# Patient Record
Sex: FEMALE | Race: WHITE | NOT HISPANIC OR LATINO | Employment: FULL TIME | URBAN - METROPOLITAN AREA
[De-identification: names, ages, dates, MRNs, and addresses within clinical notes are randomized per-mention and may not be internally consistent; named-entity substitution may affect disease eponyms.]

---

## 2023-11-01 PROBLEM — M70.70 BURSITIS OF HIP: Status: ACTIVE | Noted: 2023-11-01

## 2023-11-01 PROBLEM — T84.018A FAILURE OF TOTAL HIP ARTHROPLASTY, INITIAL ENCOUNTER (CMS-HCC): Status: ACTIVE | Noted: 2023-11-01

## 2023-11-01 PROBLEM — Z96.649 FAILURE OF TOTAL HIP ARTHROPLASTY, INITIAL ENCOUNTER (CMS-HCC): Status: ACTIVE | Noted: 2023-11-01

## 2023-11-01 PROBLEM — Z96.649 HISTORY OF TOTAL HIP REPLACEMENT: Status: ACTIVE | Noted: 2023-11-01

## 2023-11-01 PROBLEM — E03.9 HYPOTHYROID: Status: ACTIVE | Noted: 2023-11-01

## 2023-11-01 PROBLEM — I10 HYPERTENSION: Status: ACTIVE | Noted: 2023-11-01

## 2023-11-01 RX ORDER — AMLODIPINE BESYLATE 5 MG/1
TABLET ORAL
COMMUNITY

## 2023-11-01 RX ORDER — OXYCODONE AND ACETAMINOPHEN 5; 325 MG/1; MG/1
TABLET ORAL
COMMUNITY
Start: 2018-01-31

## 2023-11-01 RX ORDER — LEVOTHYROXINE SODIUM 112 UG/1
112 TABLET ORAL EVERY 24 HOURS
COMMUNITY

## 2023-11-01 RX ORDER — DOCUSATE SODIUM 100 MG/1
100 CAPSULE, LIQUID FILLED ORAL 2 TIMES DAILY
COMMUNITY
Start: 2018-01-31

## 2023-11-01 RX ORDER — LOSARTAN POTASSIUM AND HYDROCHLOROTHIAZIDE 12.5; 1 MG/1; MG/1
1 TABLET ORAL EVERY 24 HOURS
COMMUNITY

## 2023-11-01 RX ORDER — OXYBUTYNIN CHLORIDE 5 MG/1
TABLET, EXTENDED RELEASE ORAL
COMMUNITY

## 2023-11-01 RX ORDER — TRAMADOL HYDROCHLORIDE 50 MG/1
TABLET ORAL EVERY 6 HOURS
COMMUNITY
Start: 2018-01-31

## 2023-11-02 ENCOUNTER — ANCILLARY PROCEDURE (OUTPATIENT)
Dept: RADIOLOGY | Facility: CLINIC | Age: 58
End: 2023-11-02
Payer: COMMERCIAL

## 2023-11-02 ENCOUNTER — OFFICE VISIT (OUTPATIENT)
Dept: ORTHOPEDIC SURGERY | Facility: CLINIC | Age: 58
End: 2023-11-02
Payer: COMMERCIAL

## 2023-11-02 DIAGNOSIS — Z96.642 HISTORY OF HIP REPLACEMENT, TOTAL, LEFT: ICD-10-CM

## 2023-11-02 DIAGNOSIS — Z96.649 FAILED TOTAL HIP ARTHROPLASTY, INITIAL ENCOUNTER (CMS-HCC): Primary | ICD-10-CM

## 2023-11-02 DIAGNOSIS — T84.018A FAILED TOTAL HIP ARTHROPLASTY, INITIAL ENCOUNTER (CMS-HCC): Primary | ICD-10-CM

## 2023-11-02 PROCEDURE — 99214 OFFICE O/P EST MOD 30 MIN: CPT | Performed by: ORTHOPAEDIC SURGERY

## 2023-11-02 PROCEDURE — 73502 X-RAY EXAM HIP UNI 2-3 VIEWS: CPT | Mod: LEFT SIDE | Performed by: RADIOLOGY

## 2023-11-02 PROCEDURE — 73502 X-RAY EXAM HIP UNI 2-3 VIEWS: CPT | Mod: LT,FY

## 2023-11-02 NOTE — PROGRESS NOTES
Patient is a 58-year-old female who presents today for discussion of possible left total hip arthroplasty revision.  She has significant eccentric polyethylene wear with significant lysis on the osteotomy.  We previously revised her right side in 2019.  She is doing well from that standpoint.  She is relatively asymptomatic despite her radiographic appearance.    AAOx3, NAD, walks with a moderate antalgic gait  Moderate pain with flexion internal rotation  Positive Stinchfield  Mild TTP over trochanter laterally  5/5 Hip flexion/knee extension/df/pf/ehl  SILT in a nhung/saph/per/tib distribution  ½ dorsalis pedis and posterior tibial pulse  no popliteal or inguinal lymphadenopathy  no other overlying lesions  mood: euthymic  Respirations non    Plain films were reviewed by myself in clinic today.  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    Patient is now failed a greater than 3-month trial of reasonable conservative treatment and wishes to proceed with revision total hip arthroplasty which is indicated at this time.  We discussed the risk benefits alternatives to surgery.  All of their questions were answered.    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 I reviewed concerns about implant wear, loosening, breakage, infection, infection prophylaxis, DVT, PE, death and other medical and anesthetic complications of surgery. We talked about the potential for persistent pain following surgery since there are many possible causes for hip and leg pain. The patient was advised that hip replacement will only relieve pain that is coming from the hip. We talked  about leg length discrepancy, neurovascular problems and dislocation after surgery. The patient understands that we may have to lengthen the leg slightly to provide for adequate stability of the hip. I reviewed dislocation precautions and activity restrictions in detail. We discussed advantages and disadvantages of cemented and cementless implant fixation. We discussed the concerns about intraoperative fracture, ingrowth failure, thigh pain and possible post-operative weight bearing restrictions following cementless hip replacement. We discussed advantages and disadvantages of different surgical approaches. We discussed the possible need for a homologous blood transfusion. We discussed the fact that many of our patients are able to go home in 1 day or less depending on their health, mobility, pre-op preparation, individual home situation and personal preference. The patient has identified their personal goals of their joint replacement surgery and recovery and we have discussed them. These are documented in our "[employer] patient engagement platform. In addition, we have discussed the advantages and disadvantages of various implant and fixation options, as well as various surgical approaches.  The basic concepts of the joint replacement procedure has been reviewed with the patient and the patient has been provided the opportunity to see an actual implant either in the office or in our pre-op education class. All of the patients questions were answered. The patient can call my office to schedule surgery and the pre-op teaching class. I told the patient that they should contact their primary care physician to discuss fitness for surgery.     This note was created using voice recognition software and was not corrected for typographical or grammatical errors.

## 2023-11-21 ENCOUNTER — TELEPHONE (OUTPATIENT)
Dept: ORTHOPEDIC SURGERY | Facility: HOSPITAL | Age: 58
End: 2023-11-21

## 2023-11-21 NOTE — TELEPHONE ENCOUNTER
PATIENT NEED A REVISION LEFT MISA SURGERY AT [facility].  MAY I ADD A 4TH CASE FOR A REGULAR [facility] SURGERY DAY?

## 2024-05-10 ENCOUNTER — HOSPITAL ENCOUNTER (OUTPATIENT)
Dept: RADIOLOGY | Facility: HOSPITAL | Age: 59
Discharge: HOME | End: 2024-05-10
Payer: COMMERCIAL

## 2024-05-10 VITALS — BODY MASS INDEX: 30.64 KG/M2 | HEIGHT: 59 IN | WEIGHT: 152 LBS

## 2024-05-10 DIAGNOSIS — Z12.31 ENCOUNTER FOR SCREENING MAMMOGRAM FOR MALIGNANT NEOPLASM OF BREAST: ICD-10-CM

## 2024-05-10 PROCEDURE — 77067 SCR MAMMO BI INCL CAD: CPT

## 2024-05-10 PROCEDURE — 77067 SCR MAMMO BI INCL CAD: CPT | Performed by: STUDENT IN AN ORGANIZED HEALTH CARE EDUCATION/TRAINING PROGRAM

## 2024-05-10 PROCEDURE — 77063 BREAST TOMOSYNTHESIS BI: CPT | Performed by: STUDENT IN AN ORGANIZED HEALTH CARE EDUCATION/TRAINING PROGRAM

## 2024-06-28 ENCOUNTER — OFFICE VISIT (OUTPATIENT)
Dept: ORTHOPEDIC SURGERY | Facility: CLINIC | Age: 59
End: 2024-06-28
Payer: COMMERCIAL

## 2024-06-28 VITALS — HEIGHT: 59 IN | WEIGHT: 150 LBS | BODY MASS INDEX: 30.24 KG/M2

## 2024-06-28 DIAGNOSIS — T84.018A FAILED TOTAL HIP ARTHROPLASTY, INITIAL ENCOUNTER (CMS-HCC): Primary | ICD-10-CM

## 2024-06-28 DIAGNOSIS — Z96.649 FAILED TOTAL HIP ARTHROPLASTY, INITIAL ENCOUNTER (CMS-HCC): Primary | ICD-10-CM

## 2024-06-28 PROCEDURE — 99214 OFFICE O/P EST MOD 30 MIN: CPT | Performed by: ORTHOPAEDIC SURGERY

## 2024-06-28 PROCEDURE — 3008F BODY MASS INDEX DOCD: CPT | Performed by: ORTHOPAEDIC SURGERY

## 2024-06-28 ASSESSMENT — PAIN - FUNCTIONAL ASSESSMENT: PAIN_FUNCTIONAL_ASSESSMENT: NO/DENIES PAIN

## 2024-07-01 PROBLEM — T84.018A: Status: ACTIVE | Noted: 2024-06-30

## 2024-07-19 ENCOUNTER — CLINICAL SUPPORT (OUTPATIENT)
Dept: PREADMISSION TESTING | Facility: HOSPITAL | Age: 59
End: 2024-07-19
Payer: COMMERCIAL

## 2024-07-19 DIAGNOSIS — T84.018A: ICD-10-CM

## 2024-07-19 RX ORDER — FAMOTIDINE 20 MG/1
TABLET, FILM COATED ORAL 2 TIMES DAILY PRN
COMMUNITY

## 2024-07-19 RX ORDER — NAPROXEN 250 MG/1
250 TABLET ORAL
COMMUNITY

## 2024-07-23 ENCOUNTER — HOSPITAL ENCOUNTER (OUTPATIENT)
Dept: RADIOLOGY | Facility: HOSPITAL | Age: 59
Discharge: HOME | End: 2024-07-23
Payer: COMMERCIAL

## 2024-07-23 ENCOUNTER — PRE-ADMISSION TESTING (OUTPATIENT)
Dept: PREADMISSION TESTING | Facility: HOSPITAL | Age: 59
End: 2024-07-23
Payer: COMMERCIAL

## 2024-07-23 ENCOUNTER — APPOINTMENT (OUTPATIENT)
Dept: ORTHOPEDIC SURGERY | Facility: HOSPITAL | Age: 59
End: 2024-07-23
Payer: COMMERCIAL

## 2024-07-23 ENCOUNTER — LAB (OUTPATIENT)
Dept: LAB | Facility: LAB | Age: 59
End: 2024-07-23
Payer: COMMERCIAL

## 2024-07-23 VITALS
HEIGHT: 59 IN | OXYGEN SATURATION: 98 % | HEART RATE: 75 BPM | TEMPERATURE: 97.2 F | WEIGHT: 152.12 LBS | BODY MASS INDEX: 30.67 KG/M2 | RESPIRATION RATE: 16 BRPM | SYSTOLIC BLOOD PRESSURE: 137 MMHG | DIASTOLIC BLOOD PRESSURE: 71 MMHG

## 2024-07-23 DIAGNOSIS — T84.018A: ICD-10-CM

## 2024-07-23 DIAGNOSIS — Z01.818 PRE-OP TESTING: ICD-10-CM

## 2024-07-23 DIAGNOSIS — Z01.818 PRE-OP TESTING: Primary | ICD-10-CM

## 2024-07-23 LAB
ANION GAP SERPL CALC-SCNC: 11 MMOL/L (ref 10–20)
ATRIAL RATE: 70 BPM
BASOPHILS # BLD AUTO: 0.07 X10*3/UL (ref 0–0.1)
BASOPHILS NFR BLD AUTO: 1.1 %
BUN SERPL-MCNC: 18 MG/DL (ref 6–23)
CALCIUM SERPL-MCNC: 10 MG/DL (ref 8.6–10.3)
CHLORIDE SERPL-SCNC: 102 MMOL/L (ref 98–107)
CO2 SERPL-SCNC: 30 MMOL/L (ref 21–32)
CREAT SERPL-MCNC: 0.67 MG/DL (ref 0.5–1.05)
EGFRCR SERPLBLD CKD-EPI 2021: >90 ML/MIN/1.73M*2
EOSINOPHIL # BLD AUTO: 0.21 X10*3/UL (ref 0–0.7)
EOSINOPHIL NFR BLD AUTO: 3.3 %
ERYTHROCYTE [DISTWIDTH] IN BLOOD BY AUTOMATED COUNT: 12.1 % (ref 11.5–14.5)
EST. AVERAGE GLUCOSE BLD GHB EST-MCNC: 94 MG/DL
GLUCOSE SERPL-MCNC: 88 MG/DL (ref 74–99)
HBA1C MFR BLD: 4.9 %
HCT VFR BLD AUTO: 45.5 % (ref 36–46)
HGB BLD-MCNC: 15.4 G/DL (ref 12–16)
IMM GRANULOCYTES # BLD AUTO: 0.02 X10*3/UL (ref 0–0.7)
IMM GRANULOCYTES NFR BLD AUTO: 0.3 % (ref 0–0.9)
LYMPHOCYTES # BLD AUTO: 1.93 X10*3/UL (ref 1.2–4.8)
LYMPHOCYTES NFR BLD AUTO: 30.2 %
MCH RBC QN AUTO: 31.8 PG (ref 26–34)
MCHC RBC AUTO-ENTMCNC: 33.8 G/DL (ref 32–36)
MCV RBC AUTO: 94 FL (ref 80–100)
MONOCYTES # BLD AUTO: 0.44 X10*3/UL (ref 0.1–1)
MONOCYTES NFR BLD AUTO: 6.9 %
NEUTROPHILS # BLD AUTO: 3.72 X10*3/UL (ref 1.2–7.7)
NEUTROPHILS NFR BLD AUTO: 58.2 %
NRBC BLD-RTO: 0 /100 WBCS (ref 0–0)
P AXIS: 28 DEGREES
P OFFSET: 212 MS
P ONSET: 157 MS
PLATELET # BLD AUTO: 203 X10*3/UL (ref 150–450)
POTASSIUM SERPL-SCNC: 3.8 MMOL/L (ref 3.5–5.3)
PR INTERVAL: 136 MS
Q ONSET: 225 MS
QRS COUNT: 12 BEATS
QRS DURATION: 72 MS
QT INTERVAL: 372 MS
QTC CALCULATION(BAZETT): 401 MS
QTC FREDERICIA: 391 MS
R AXIS: -14 DEGREES
RBC # BLD AUTO: 4.84 X10*6/UL (ref 4–5.2)
SODIUM SERPL-SCNC: 139 MMOL/L (ref 136–145)
T AXIS: -3 DEGREES
T OFFSET: 411 MS
VENTRICULAR RATE: 70 BPM
WBC # BLD AUTO: 6.4 X10*3/UL (ref 4.4–11.3)

## 2024-07-23 PROCEDURE — 87081 CULTURE SCREEN ONLY: CPT | Mod: AHULAB | Performed by: NURSE PRACTITIONER

## 2024-07-23 PROCEDURE — 73502 X-RAY EXAM HIP UNI 2-3 VIEWS: CPT | Mod: LT

## 2024-07-23 PROCEDURE — 93005 ELECTROCARDIOGRAM TRACING: CPT | Performed by: NURSE PRACTITIONER

## 2024-07-23 PROCEDURE — 80048 BASIC METABOLIC PNL TOTAL CA: CPT

## 2024-07-23 PROCEDURE — 36415 COLL VENOUS BLD VENIPUNCTURE: CPT

## 2024-07-23 PROCEDURE — 85025 COMPLETE CBC W/AUTO DIFF WBC: CPT

## 2024-07-23 PROCEDURE — 83036 HEMOGLOBIN GLYCOSYLATED A1C: CPT

## 2024-07-23 RX ORDER — CHLORHEXIDINE GLUCONATE ORAL RINSE 1.2 MG/ML
SOLUTION DENTAL
Qty: 475 ML | Refills: 0 | Status: SHIPPED | OUTPATIENT
Start: 2024-07-23

## 2024-07-23 ASSESSMENT — ENCOUNTER SYMPTOMS
NEUROLOGICAL NEGATIVE: 1
ABDOMINAL PAIN: 0
NECK NEGATIVE: 1
ARTHRALGIAS: 0
RESPIRATORY NEGATIVE: 1
DYSPNEA WITH EXERTION: 0
CONSTIPATION: 0
CONSTITUTIONAL NEGATIVE: 1
BRUISES/BLEEDS EASILY: 0
GASTROINTESTINAL NEGATIVE: 1
ENDOCRINE NEGATIVE: 1
PALPITATIONS: 0
NAUSEA: 0
DIARRHEA: 0
LIMITED RANGE OF MOTION: 1
MYALGIAS: 0
DYSPNEA AT REST: 0

## 2024-07-23 NOTE — H&P (VIEW-ONLY)
[facility]       Name: Ashley Ngo (Ashley Ngo)  /Age: 1965/58 y.o.       Date of Consult: 24     Referring Provider: Dr. Muniz    Surgery, Date, and Length: Left Hip Total Arthroplasty Revision - Left, 24, 240 min.    Ashley Ngo is a 58 year-old female who presents to the [facility] for perioperative risk assessment prior to surgery.  Ashley Ngo  has significant eccentric polyethylene wear with significant lysis on the osteotomy. She has had a previous revision of her right side in 2019. She is doing well from that standpoint. She is relatively asymptomatic despite her radiographic appearance.   Patient presents with a primary diagnosis of Broken internal joint prosthesis, other site, initial encounter .     This note was created in part upon personal review of patient's medical records.      Patient is scheduled to have  Left Hip Total Arthroplasty Revision - Left      +PONV  Pt denies any past history of anesthetic complications such as awareness, prolonged sedation, dental damage, aspiration, cardiac arrest, difficult intubation, difficult I.V. access or unexpected hospital admissions.  NO malignant hyperthermia and or pseudocholinesterase deficiency.  No history of blood transfusions     The patient is not a Protestant and will accept blood and blood products if medically indicated.       Past Medical History:   Diagnosis Date    Broken internal joint prosthesis (CMS-HCC)     Bursitis     hips    GERD (gastroesophageal reflux disease)     Hip pain     HLD (hyperlipidemia)     patient denies    HTN (hypertension)     Hypothyroidism     JRA (juvenile rheumatoid arthritis) (Multi)     OA (osteoarthritis)     PONV (postoperative nausea and vomiting)        Past Surgical History:   Procedure Laterality Date    BI US GUIDED BREAST LOCALIZATION AND BIOPSY LEFT Left 2021    BI US GUIDED BREAST LOCALIZATION AND BIOPSY LEFT LAK CLINICAL  [facility]    BREAST BIOPSY Left 04/16/2021    COLONOSCOPY      ECTOPIC PREGNANCY SURGERY      ENDOMETRIAL ABLATION      HIP SURGERY Right 2017    JOINT REPLACEMENT Bilateral 1997    hips    TUBAL LIGATION      UPPER GASTROINTESTINAL ENDOSCOPY         Family History   Problem Relation Name Age of Onset    Ovarian cancer Mother  56    Lung disease Mother      Hypertension Father      Other (bladder cancer) Father      Heart attack Father      Cardiomyopathy Father      Other (bile duct cancer) Sister      Breast cancer Maternal Grandmother  68     Social History     Socioeconomic History    Marital status:    Tobacco Use    Smoking status: Never    Smokeless tobacco: Never   Vaping Use    Vaping status: Never Used   Substance and Sexual Activity    Alcohol use: Yes     Comment: socially 2x/week, 2-3 cocktails    Drug use: Defer    Sexual activity: Defer     Social Determinants of Health     Financial Resource Strain: Low Risk  (3/3/2023)    Received from [facility]    Overall Financial Resource Strain (CARDIA)     Difficulty of Paying Living Expenses: Not hard at all   Food Insecurity: No Food Insecurity (3/3/2023)    Received from [facility]    Hunger Vital Sign     Worried About Running Out of Food in the Last Year: Never true     Ran Out of Food in the Last Year: Never true   Transportation Needs: No Transportation Needs (3/3/2023)    Received from [facility]    PRAPARE - Transportation     Lack of Transportation (Medical): No     Lack of Transportation (Non-Medical): No   Physical Activity: Inactive (3/3/2023)    Received from [facility]    Exercise Vital Sign     Days of Exercise per Week: 0 days     Minutes of Exercise per Session: 0 min   Stress: No Stress Concern Present (3/3/2023)    Received from [facility]    [employer] - Occupational Stress Questionnaire      Feeling of Stress : Not at all   Social Connections: Moderately Isolated (3/3/2023)    Received from [facility], [facility]    Social Connection and Isolation Panel [NHANES]     Frequency of Communication with Friends and Family: Three times a week     Frequency of Social Gatherings with Friends and Family: Once a week     Attends Restorationism Services: Never     Active Member of Clubs or Organizations: No     Attends Club or Organization Meetings: Never     Marital Status:    Housing Stability: Unknown (3/3/2023)    Received from [facility], [facility]    Housing Stability Vital Sign     Unable to Pay for Housing in the Last Year: No     In the last 12 months, was there a time when you did not have a steady place to sleep or slept in a shelter (including now)?: No        Allergies   Allergen Reactions    Meloxicam Hives, Rash and Unknown         Current Outpatient Medications:     amLODIPine (Norvasc) 5 mg tablet, 1 tablet Orally Once a day, Disp: , Rfl:     calcium carbonate EX (Tums Extra Strength) 300 mg (750 mg) chewable tablet, Chew 300 mg 3 times a day as needed for indigestion or heartburn., Disp: , Rfl:     glucosam/chond-msm1/C/erik/bor (GLUCOSAMINE-CHOND-MSM COMPLEX ORAL), Take 1 capsule by mouth once daily., Disp: , Rfl:     levothyroxine (Synthroid, Levoxyl) 112 mcg tablet, Take 1 tablet (112 mcg) by mouth once every 24 hours., Disp: , Rfl:     losartan-hydrochlorothiazide (Hyzaar) 100-12.5 mg tablet, Take 1 tablet by mouth once every 24 hours., Disp: , Rfl:     naproxen (Naprosyn) 250 mg tablet, Take 1 tablet (250 mg) by mouth 2 times daily (morning and late afternoon)., Disp: , Rfl:     oxybutynin XL (Ditropan-XL) 5 mg 24 hr tablet, 1 tab(s) orally once a day (oxybutynin XL), Disp: , Rfl:     chlorhexidine (Peridex) 0.12 % solution, Swish for 30 seconds and spit 15mL of solution the night before and morning of surgery (Patient not taking: Reported on 7/23/2024), Disp:  "475 mL, Rfl: 0    famotidine (Pepcid) 20 mg tablet, Take by mouth 2 times a day as needed for heartburn., Disp: , Rfl:       PAT ROS:   Constitutional:   neg    Neuro/Psych:   neg    Eyes:    use of corrective lenses (contacts/glasses)  Ears:    no hearing aides  Nose:   Mouth:   neg    Throat:   neg    Neck:   neg    Cardio:    Functional 4 Mets. Patient denies SOB walking up 2 flights of stairs if patient did not have any limitations with her hip   no chest pain   no palpitations   no dyspnea   no BALTAZAR  Respiratory:   neg    Endocrine:   neg    GI:   neg     no abdominal pain   no constipation   no diarrhea   no nausea  :   neg    Musculoskeletal:    no arthralgias   no myalgias   decreased ROM  Hematologic:    does not bruise/bleed easily   no history of blood transfusion   no blood clots  Skin:  neg        Physical Exam  Physical exam within normal limits.          PAT AIRWAY:   Airway:     Mallampati::  II    Neck ROM::  Full   No broken teeth, no dentures and no missing teeth          Visit Vitals  /71   Pulse 75   Temp 36.2 °C (97.2 °F)   Resp 16   Ht 1.486 m (4' 10.5\")   Wt 69 kg (152 lb 1.9 oz)   SpO2 98%   BMI 31.25 kg/m²   OB Status Postmenopausal   Smoking Status Never   BSA 1.69 m²     Plan    Cardiovascular:  Patient denies any chest pain, tightness, heaviness, pressure, radiating pain, palpitations, irregular heartbeats, lightheadedness, cough, congestion, shortness of breath, BALTAZAR, PND, near syncope, weight loss or gain.    HTN:  Amlodipine-patient to take on dos  Hold any evening dose the night before the day of surgery. Hold the day of surgery.    EKG in PAT on 07/23/24  NSR@70 bpm       RCRI: 0 Risk of Mace: 3.9%    Pulm:  Denies any shortness of breath or activity intolerance.    Stop Bang= 2 (age, htn) low risk    Renal/endo:  Hypothyroidism-  Synthroid-patient to take on dos    GI/:  GERD:  TUMS-patient to continue until night before surgery  Pepcid-patient to take on " dos    Heme:  Patient instructed to ambulate as soon as possible postoperatively to decrease thromboembolic risk.    Initiate mechanical DVT prophylaxis as soon as possible and initiate chemical prophylaxis when deemed safe from a bleeding standpoint post surgery.    Caprini=6    Risk assessment complete.  Patient is scheduled for an intermediate surgical risk procedure.      Labs/testing obtained in PAT on 07/23/24  CBC, BMP, HgA1c, MRSA, EKG  Lab Results   Component Value Date    WBC 6.4 07/23/2024    HGB 15.4 07/23/2024    HCT 45.5 07/23/2024    MCV 94 07/23/2024     07/23/2024     Lab Results   Component Value Date    GLUCOSE 88 07/23/2024    CALCIUM 10.0 07/23/2024     07/23/2024    K 3.8 07/23/2024    CO2 30 07/23/2024     07/23/2024    BUN 18 07/23/2024    CREATININE 0.67 07/23/2024     Lab Results   Component Value Date    HGBA1C 4.9 07/23/2024     Labs reviewed, unremarkable.    Follow up: MRSA    Preoperative medication instructions were provided and reviewed with the patient.  Any additional testing or evaluation was explained to the patient.  Nothing by mouth instructions were discussed and patient's questions were answered prior to conclusion to this encounter.  Patient verbalized understanding of preoperative instructions given in preadmission testing; discharge instructions available in EMR.    This note was dictated with speech recognition.  Minor errors may have been detected during use of speech recognition.

## 2024-07-23 NOTE — CPM/PAT H&P
[facility] Evaluation       Name: Ashley Ngo (Ashley Ngo)  /Age: 1965/58 y.o.       Date of Consult: 24     Referring Provider: Dr. Muniz    Surgery, Date, and Length: Left Hip Total Arthroplasty Revision - Left, 24, 240 min.    Ashley Ngo is a 58 year-old female who presents to the [facility] for perioperative risk assessment prior to surgery.  Ashley Ngo  has significant eccentric polyethylene wear with significant lysis on the osteotomy. She has had a previous revision of her right side in 2019. She is doing well from that standpoint. She is relatively asymptomatic despite her radiographic appearance.   Patient presents with a primary diagnosis of Broken internal joint prosthesis, other site, initial encounter .     This note was created in part upon personal review of patient's medical records.      Patient is scheduled to have  Left Hip Total Arthroplasty Revision - Left      +PONV  Pt denies any past history of anesthetic complications such as awareness, prolonged sedation, dental damage, aspiration, cardiac arrest, difficult intubation, difficult I.V. access or unexpected hospital admissions.  NO malignant hyperthermia and or pseudocholinesterase deficiency.  No history of blood transfusions     The patient is not a Yazdanism and will accept blood and blood products if medically indicated.       Past Medical History:   Diagnosis Date    Broken internal joint prosthesis (CMS-HCC)     Bursitis     hips    GERD (gastroesophageal reflux disease)     Hip pain     HLD (hyperlipidemia)     patient denies    HTN (hypertension)     Hypothyroidism     JRA (juvenile rheumatoid arthritis) (Multi)     OA (osteoarthritis)     PONV (postoperative nausea and vomiting)        Past Surgical History:   Procedure Laterality Date    BI US GUIDED BREAST LOCALIZATION AND BIOPSY LEFT Left 2021    BI US GUIDED BREAST LOCALIZATION AND BIOPSY LEFT LAK CLINICAL  [facility]    BREAST BIOPSY Left 04/16/2021    COLONOSCOPY      ECTOPIC PREGNANCY SURGERY      ENDOMETRIAL ABLATION      HIP SURGERY Right 2017    JOINT REPLACEMENT Bilateral 1997    hips    TUBAL LIGATION      UPPER GASTROINTESTINAL ENDOSCOPY         Family History   Problem Relation Name Age of Onset    Ovarian cancer Mother  56    Lung disease Mother      Hypertension Father      Other (bladder cancer) Father      Heart attack Father      Cardiomyopathy Father      Other (bile duct cancer) Sister      Breast cancer Maternal Grandmother  68     Social History     Socioeconomic History    Marital status:    Tobacco Use    Smoking status: Never    Smokeless tobacco: Never   Vaping Use    Vaping status: Never Used   Substance and Sexual Activity    Alcohol use: Yes     Comment: socially 2x/week, 2-3 cocktails    Drug use: Defer    Sexual activity: Defer     Social Determinants of Health     Financial Resource Strain: Low Risk  (3/3/2023)    Received from [facility]    Overall Financial Resource Strain (CARDIA)     Difficulty of Paying Living Expenses: Not hard at all   Food Insecurity: No Food Insecurity (3/3/2023)    Received from [facility]    Hunger Vital Sign     Worried About Running Out of Food in the Last Year: Never true     Ran Out of Food in the Last Year: Never true   Transportation Needs: No Transportation Needs (3/3/2023)    Received from [facility]    PRAPARE - Transportation     Lack of Transportation (Medical): No     Lack of Transportation (Non-Medical): No   Physical Activity: Inactive (3/3/2023)    Received from [facility]    Exercise Vital Sign     Days of Exercise per Week: 0 days     Minutes of Exercise per Session: 0 min   Stress: No Stress Concern Present (3/3/2023)    Received from [facility]    [employer] - Occupational Stress Questionnaire      Feeling of Stress : Not at all   Social Connections: Moderately Isolated (3/3/2023)    [facility]    Social Connection and Isolation Panel [NHANES]     Frequency of Communication with Friends and Family: Three times a week     Frequency of Social Gatherings with Friends and Family: Once a week     Attends Jain Services: Never     Active Member of Clubs or Organizations: No     Attends Club or Organization Meetings: Never     Marital Status:    Housing Stability: Unknown (3/3/2023)    Received from [facility], [facility]    Housing Stability Vital Sign     Unable to Pay for Housing in the Last Year: No     In the last 12 months, was there a time when you did not have a steady place to sleep or slept in a shelter (including now)?: No        Allergies   Allergen Reactions    Meloxicam Hives, Rash and Unknown         Current Outpatient Medications:     amLODIPine (Norvasc) 5 mg tablet, 1 tablet Orally Once a day, Disp: , Rfl:     calcium carbonate EX (Tums Extra Strength) 300 mg (750 mg) chewable tablet, Chew 300 mg 3 times a day as needed for indigestion or heartburn., Disp: , Rfl:     glucosam/chond-msm1/C/erik/bor (GLUCOSAMINE-CHOND-MSM COMPLEX ORAL), Take 1 capsule by mouth once daily., Disp: , Rfl:     levothyroxine (Synthroid, Levoxyl) 112 mcg tablet, Take 1 tablet (112 mcg) by mouth once every 24 hours., Disp: , Rfl:     losartan-hydrochlorothiazide (Hyzaar) 100-12.5 mg tablet, Take 1 tablet by mouth once every 24 hours., Disp: , Rfl:     naproxen (Naprosyn) 250 mg tablet, Take 1 tablet (250 mg) by mouth 2 times daily (morning and late afternoon)., Disp: , Rfl:     oxybutynin XL (Ditropan-XL) 5 mg 24 hr tablet, 1 tab(s) orally once a day (oxybutynin XL), Disp: , Rfl:     chlorhexidine (Peridex) 0.12 % solution, Swish for 30 seconds and spit 15mL of solution the night before and morning of surgery (Patient not taking: Reported on 7/23/2024), Disp:  "475 mL, Rfl: 0    famotidine (Pepcid) 20 mg tablet, Take by mouth 2 times a day as needed for heartburn., Disp: , Rfl:       PAT ROS:   Constitutional:   neg    Neuro/Psych:   neg    Eyes:    use of corrective lenses (contacts/glasses)  Ears:    no hearing aides  Nose:   Mouth:   neg    Throat:   neg    Neck:   neg    Cardio:    Functional 4 Mets. Patient denies SOB walking up 2 flights of stairs if patient did not have any limitations with her hip   no chest pain   no palpitations   no dyspnea   no BALTAZAR  Respiratory:   neg    Endocrine:   neg    GI:   neg     no abdominal pain   no constipation   no diarrhea   no nausea  :   neg    Musculoskeletal:    no arthralgias   no myalgias   decreased ROM  Hematologic:    does not bruise/bleed easily   no history of blood transfusion   no blood clots  Skin:  neg        Physical Exam  Physical exam within normal limits.          PAT AIRWAY:   Airway:     Mallampati::  II    Neck ROM::  Full   No broken teeth, no dentures and no missing teeth          Visit Vitals  /71   Pulse 75   Temp 36.2 °C (97.2 °F)   Resp 16   Ht 1.486 m (4' 10.5\")   Wt 69 kg (152 lb 1.9 oz)   SpO2 98%   BMI 31.25 kg/m²   OB Status Postmenopausal   Smoking Status Never   BSA 1.69 m²     Plan    Cardiovascular:  Patient denies any chest pain, tightness, heaviness, pressure, radiating pain, palpitations, irregular heartbeats, lightheadedness, cough, congestion, shortness of breath, BALTAZAR, PND, near syncope, weight loss or gain.    HTN:  Amlodipine-patient to take on dos  Hold any evening dose the night before the day of surgery. Hold the day of surgery.    EKG in PAT on 07/23/24  NSR@70 bpm       RCRI: 0 Risk of Mace: 3.9%    Pulm:  Denies any shortness of breath or activity intolerance.    Stop Bang= 2 (age, htn) low risk    Renal/endo:  Hypothyroidism-  Synthroid-patient to take on dos    GI/:  GERD:  TUMS-patient to continue until night before surgery  Pepcid-patient to take on " dos    Heme:  Patient instructed to ambulate as soon as possible postoperatively to decrease thromboembolic risk.    Initiate mechanical DVT prophylaxis as soon as possible and initiate chemical prophylaxis when deemed safe from a bleeding standpoint post surgery.    Caprini=6    Risk assessment complete.  Patient is scheduled for an intermediate surgical risk procedure.      Labs/testing obtained in PAT on 07/23/24  CBC, BMP, HgA1c, MRSA, EKG  Lab Results   Component Value Date    WBC 6.4 07/23/2024    HGB 15.4 07/23/2024    HCT 45.5 07/23/2024    MCV 94 07/23/2024     07/23/2024     Lab Results   Component Value Date    GLUCOSE 88 07/23/2024    CALCIUM 10.0 07/23/2024     07/23/2024    K 3.8 07/23/2024    CO2 30 07/23/2024     07/23/2024    BUN 18 07/23/2024    CREATININE 0.67 07/23/2024     Lab Results   Component Value Date    HGBA1C 4.9 07/23/2024     Labs reviewed, unremarkable.    Follow up: MRSA    Preoperative medication instructions were provided and reviewed with the patient.  Any additional testing or evaluation was explained to the patient.  Nothing by mouth instructions were discussed and patient's questions were answered prior to conclusion to this encounter.  Patient verbalized understanding of preoperative instructions given in preadmission testing; discharge instructions available in EMR.    This note was dictated with speech recognition.  Minor errors may have been detected during use of speech recognition.

## 2024-07-23 NOTE — CPM/PAT NURSE NOTE
CPM/PAT Nurse Note      Name: Ashley Ngo (Ashley Ngo)  /Age: 1965/58 y.o.       Past Medical History:   Diagnosis Date    Broken internal joint prosthesis (CMS-HCC)     Bursitis     hips    GERD (gastroesophageal reflux disease)     Hip pain     HLD (hyperlipidemia)     patient denies    HTN (hypertension)     Hypothyroidism     JRA (juvenile rheumatoid arthritis) (Multi)     OA (osteoarthritis)     PONV (postoperative nausea and vomiting)        Past Surgical History:   Procedure Laterality Date    BI US GUIDED BREAST LOCALIZATION AND BIOPSY LEFT Left 2021    BI US GUIDED BREAST LOCALIZATION AND BIOPSY LEFT [facility]    BREAST BIOPSY Left 2021    COLONOSCOPY      ECTOPIC PREGNANCY SURGERY      ENDOMETRIAL ABLATION      HIP SURGERY Right 2017    JOINT REPLACEMENT Bilateral     hips    TUBAL LIGATION      UPPER GASTROINTESTINAL ENDOSCOPY         Patient Sexual activity questions deferred to the physician.    Family History   Problem Relation Name Age of Onset    Ovarian cancer Mother  56    Lung disease Mother      Hypertension Father      Other (bladder cancer) Father      Heart attack Father      Cardiomyopathy Father      Other (bile duct cancer) Sister      Breast cancer Maternal Grandmother  68       Allergies   Allergen Reactions    Meloxicam Hives, Rash and Unknown       Prior to Admission medications    Medication Sig Start Date End Date Taking? Authorizing Provider   amLODIPine (Norvasc) 5 mg tablet 1 tablet Orally Once a day    Historical Provider, MD   calcium carbonate EX (Tums Extra Strength) 300 mg (750 mg) chewable tablet Chew 300 mg 3 times a day as needed for indigestion or heartburn.    Historical Provider, MD   chlorhexidine (Peridex) 0.12 % solution Swish for 30 seconds and spit 15mL of solution the night before and morning of surgery  Patient not taking: Reported on 2024   EILEEN Santiago-CNP   famotidine (Pepcid) 20 mg tablet  Take by mouth 2 times a day as needed for heartburn.    Historical Provider, MD   glucosam/chond-msm1/C/erik/bor (GLUCOSAMINE-CHOND-MSM COMPLEX ORAL) Take 1 capsule by mouth once daily.    Historical Provider, MD   levothyroxine (Synthroid, Levoxyl) 112 mcg tablet Take 1 tablet (112 mcg) by mouth once every 24 hours.    Historical Provider, MD   losartan-hydrochlorothiazide (Hyzaar) 100-12.5 mg tablet Take 1 tablet by mouth once every 24 hours.    Historical Provider, MD   naproxen (Naprosyn) 250 mg tablet Take 1 tablet (250 mg) by mouth 2 times daily (morning and late afternoon).    Historical Provider, MD   oxybutynin XL (Ditropan-XL) 5 mg 24 hr tablet 1 tab(s) orally once a day (oxybutynin XL)    Historical Provider, MD   docusate sodium (Colace) 100 mg capsule Take 1 capsule (100 mg) by mouth twice a day. 1/31/18 7/19/24  Historical Provider, MD   oxyCODONE-acetaminophen (Percocet) 5-325 mg tablet Take by mouth every 4 hours. 1/31/18 7/19/24  Historical Provider, MD   traMADol (Ultram) 50 mg tablet Take by mouth every 6 hours. 1/31/18 7/19/24  Historical Provider, MD SOLANGE HOOK     DASI Risk Score    No data to display       Caprini DVT Assessment    No data to display       Modified Frailty Index    No data to display       CHADS2 Stroke Risk  Current as of 2 hours ago        N/A 3 to 100%: High Risk   2 to < 3%: Medium Risk   0 to < 2%: Low Risk     Last Change: N/A          This score determines the patient's risk of having a stroke if the patient has atrial fibrillation.        This score is not applicable to this patient. Components are not calculated.          Revised Cardiac Risk Index    No data to display       Apfel Simplified Score    No data to display       Risk Analysis Index Results This Encounter    No data found in the last 1 encounters.         Nurse Plan of Action:       .mrsa

## 2024-07-23 NOTE — CPM/PAT NURSE NOTE
CPM/PAT Nurse Note      Name: Ashley Ngo (Ashley Ngo)  /Age: 1965/58 y.o.       Past Medical History:   Diagnosis Date    Broken internal joint prosthesis (CMS-HCC)     Bursitis     hips    GERD (gastroesophageal reflux disease)     Hip pain     HLD (hyperlipidemia)     patient denies    HTN (hypertension)     Hypothyroidism     JRA (juvenile rheumatoid arthritis) (Multi)     OA (osteoarthritis)     PONV (postoperative nausea and vomiting)        Past Surgical History:   Procedure Laterality Date    BI US GUIDED BREAST LOCALIZATION AND BIOPSY LEFT Left 2021    BI US GUIDED BREAST LOCALIZATION AND BIOPSY LEFT [facility]    BREAST BIOPSY Left 2021    COLONOSCOPY      ECTOPIC PREGNANCY SURGERY      ENDOMETRIAL ABLATION      HIP SURGERY Right 2017    JOINT REPLACEMENT Bilateral     hips    TUBAL LIGATION      UPPER GASTROINTESTINAL ENDOSCOPY         Patient Sexual activity questions deferred to the physician.    Family History   Problem Relation Name Age of Onset    Ovarian cancer Mother  56    Lung disease Mother      Hypertension Father      Other (bladder cancer) Father      Heart attack Father      Cardiomyopathy Father      Other (bile duct cancer) Sister      Breast cancer Maternal Grandmother  68       Allergies   Allergen Reactions    Meloxicam Hives, Rash and Unknown       Prior to Admission medications    Medication Sig Start Date End Date Taking? Authorizing Provider   amLODIPine (Norvasc) 5 mg tablet 1 tablet Orally Once a day    Historical Provider, MD   calcium carbonate EX (Tums Extra Strength) 300 mg (750 mg) chewable tablet Chew 300 mg 3 times a day as needed for indigestion or heartburn.    Historical Provider, MD   chlorhexidine (Peridex) 0.12 % solution Swish for 30 seconds and spit 15mL of solution the night before and morning of surgery  Patient not taking: Reported on 2024   EILEEN Santiago-CNP   famotidine (Pepcid) 20 mg tablet  Take by mouth 2 times a day as needed for heartburn.    Historical Provider, MD   glucosam/chond-msm1/C/erik/bor (GLUCOSAMINE-CHOND-MSM COMPLEX ORAL) Take 1 capsule by mouth once daily.    Historical Provider, MD   levothyroxine (Synthroid, Levoxyl) 112 mcg tablet Take 1 tablet (112 mcg) by mouth once every 24 hours.    Historical Provider, MD   losartan-hydrochlorothiazide (Hyzaar) 100-12.5 mg tablet Take 1 tablet by mouth once every 24 hours.    Historical Provider, MD   naproxen (Naprosyn) 250 mg tablet Take 1 tablet (250 mg) by mouth 2 times daily (morning and late afternoon).    Historical Provider, MD   oxybutynin XL (Ditropan-XL) 5 mg 24 hr tablet 1 tab(s) orally once a day (oxybutynin XL)    Historical Provider, MD   docusate sodium (Colace) 100 mg capsule Take 1 capsule (100 mg) by mouth twice a day. 1/31/18 7/19/24  Historical Provider, MD   oxyCODONE-acetaminophen (Percocet) 5-325 mg tablet Take by mouth every 4 hours. 1/31/18 7/19/24  Historical Provider, MD   traMADol (Ultram) 50 mg tablet Take by mouth every 6 hours. 1/31/18 7/19/24  Historical Provider, MD SOLANGE HOOK     DASI Risk Score    No data to display       Caprini DVT Assessment    No data to display       Modified Frailty Index    No data to display       CHADS2 Stroke Risk  Current as of 2 hours ago        N/A 3 to 100%: High Risk   2 to < 3%: Medium Risk   0 to < 2%: Low Risk     Last Change: N/A          This score determines the patient's risk of having a stroke if the patient has atrial fibrillation.        This score is not applicable to this patient. Components are not calculated.          Revised Cardiac Risk Index    No data to display       Apfel Simplified Score    No data to display       Risk Analysis Index Results This Encounter    No data found in the last 1 encounters.         Nurse Plan of Action:     .gen

## 2024-07-23 NOTE — PREPROCEDURE INSTRUCTIONS
Medication List            Accurate as of July 23, 2024  9:09 AM. Always use your most recent med list.                amLODIPine 5 mg tablet  Commonly known as: Norvasc  Medication Adjustments for Surgery: Take morning of surgery with sip of water, no other fluids     calcium carbonate  mg (750 mg) chewable tablet  Commonly known as: Tums Extra Strength  Medication Adjustments for Surgery: Continue until night before surgery     chlorhexidine 0.12 % solution  Commonly known as: Peridex  Swish for 30 seconds and spit 15mL of solution the night before and morning of surgery     famotidine 20 mg tablet  Commonly known as: Pepcid  Medication Adjustments for Surgery: Take morning of surgery with sip of water, no other fluids     GLUCOSAMINE-CHOND-MSM COMPLEX ORAL  Medication Adjustments for Surgery: Stop 7 days before surgery     levothyroxine 112 mcg tablet  Commonly known as: Synthroid, Levoxyl  Medication Adjustments for Surgery: Take morning of surgery with sip of water, no other fluids     losartan-hydrochlorothiazide 100-12.5 mg tablet  Commonly known as: Hyzaar  Notes to patient: Hold any evening dose the night before the day of surgery. Hold the day of surgery.      naproxen 250 mg tablet  Commonly known as: Naprosyn  Medication Adjustments for Surgery: Stop 7 days before surgery     oxybutynin XL 5 mg 24 hr tablet  Commonly known as: Ditropan-XL  Medication Adjustments for Surgery: Continue until night before surgery                        **Concerning above medication instructions, if medication is normally taken at night, continue normal schedule.**  **DO NOT TAKE NIGHT PRIOR AND MORNING OF SURGERY**    CONTACT SURGEON'S OFFICE IF YOU DEVELOP:  * Fever = 100.4 F   * New respiratory symptoms (e.g. cough, shortness of breath, respiratory distress, sore throat)  * Recent loss of taste or smell  *Flu like symptoms such as headache, fatigue or gastrointestinal symptoms  * You develop any open sores,  shingles, burning or painful urination   AND/OR:  * You no longer wish to have the surgery.  * Any other personal circumstances change that may lead to the need to cancel or defer this surgery.  *You were admitted to any hospital within one week of your planned procedure.    SMOKING:  *Quitting smoking can make a huge difference to your health and recovery from surgery.    *If you need help with quitting, call 6-020-QUIT-NOW.    THE DAY BEFORE SURGERY:  *Do not eat any food after midnight the night before surgery.   *You are permitted to drink clear liquids (i.e. water, black coffee (no milk or cream), tea, apple juice and electrolyte drinks (gatorade)) up to 13.5 ounces, up to 2 hours before your arrival time.  *You may chew gum until 2 hours before your surgery    SURGICAL TIME  *You will be contacted between 2 p.m. and 6 p.m. the business day before your surgery with your arrival time.  *If you haven't received a call by 6pm, call [phone].  *Scheduled surgery times may change and you will be notified if this occurs-check your personal voicemail for any updates.    ON THE MORNING OF SURGERY:  *Wear comfortable, loose fitting clothing.   *Do not use moisturizers, creams, lotions or perfume.  *All jewelry and valuables should be left at home.  *Prosthetic devices such as contact lenses, hearing aids, dentures, eyelash extensions, hairpins and body piercing must be removed before surgery.    BRING WITH YOU:  *Photo ID and insurance card  *Current list of medicines and allergies  *Pacemaker/Defibrillator/Heart stent cards  *CPAP machine and mask  *Slings/splints/crutches  *Copy of your complete Advanced Directive/DHPOA-if applicable  *Neurostimulator implant remote    PARKING AND ARRIVAL:  *Check in at the Main Entrance desk and let them know you are here for surgery.  *You will be directed to the 2nd floor surgical waiting area.    AFTER OUTPATIENT SURGERY:  *A responsible adult MUST accompany you at the time of  discharge and stay with you for 24 hours after your surgery.  *You may NOT drive yourself home after surgery.  *You may use a taxi or ride sharing service ([employer], [employer]) to return home ONLY if you are accompanied by a friend or family member.  *Instructions for resuming your medications will be provided by your surgeon.       Home Preoperative Antibacterial Shower     What is a home preoperative antibacterial shower?  This shower is a way of cleaning the skin with a germ killing soap before surgery.  The soap contains chlorhexidine, commonly known as CHG.  CHG is a soap for your skin with germ killing ability.  Let your doctor know if you are allergic to chlorhexidine.    Why do I need to take a preoperative antibacterial shower?  Skin is not sterile.  It is best to try to make your skin as free of germs as possible before surgery.  Proper cleansing with a germ killing soap before surgery can lower the number of germs on your skin.  This helps to reduce the risk of infection at the surgical site.  Following the instructions listed below will help you prepare your skin for surgery.      How do I use the CHG skin cleanser?  Steps:  Begin using your CHG soap five days before your scheduled surgery on ________________________.    Days 1-4 Shower before bed:  Wash your face and genitals with your normal soap and rinse.    Wash and rinse your hair using the CHG soap. Rinse completely, do not condition your   Hair.          3.    Apply the CHG soap to a clean wet washcloth.  Turn the water off or move away                From the water spray to avoid premature rinsing of the CHG soap as you are applying.     4.   Lather your entire body from the neck down.  Do not use on your face or genitals.   Pay special attention to the area(s) where your incision(s) will be located unless they are on your face.  Avoid scrubbing your skin too hard.  The important point is to have the CHG soap sit on your skin for 3 minutes.    When the 3  minutes are up, turn on the water and rinse the CHG soap off your body completely.   Pat yourself dry with a clean, freshly-laundered towel.  Dress in clean, freshly laundered night clothes.    Be sure to sleep with clean, freshly laundered sheets.  Day 5:  Last shower is the morning before surgery: Follow above Instructions.    NOTE:    *Hair extensions should be removed    *Keep CHG soap out of eyes and ear canals   *DO NOT wash with regular soap on your body after you have used the CHG        soap solution  *DO NOT apply powders, lotions, or perfume.  *Deodorant may be used days 1-4, BUT NOT the day of surgery    Who should I contact if I have any questions regarding the use of CHG soap?  Call the [facility], Preadmission Testing at [phone] if you have any questions.              Patient Information: Pre-Operative Infection Prevention Measures     Why did I have my nose, under my arms and groin swabbed?  The purpose of the swab is to identify Staphylococcus aureus inside your nose or on your skin.  The swab was sent to the laboratory for culture.  A positive swab/culture for Staphylococcus aureus is called colonization or carriage.      What is Staphylococcus aureus?  Staphylococcus aureus, also known as “staph”, is a germ found on the skin or in the nose of healthy people.  Sometimes Staphylococcus aureus can get into the body and cause an infection.  This can be minor (such as pimples, boils or other skin problems).  It might also be serious (such as blood infection, pneumonia or a surgical site infection).    What is Staphylococcus aureus colonization or carriage?  Colonization or carriage means that a person has the germ but is not sick from it.  These bacteria can be spread on the hands or when breathing or sneezing.    How is Staphylococcus aureus spread?  It is most often spread by close contact with a person or item that carries it.    What happens if my culture is  positive for Staphylococcus aureus?  Your doctor/medical team will use this information to guide any antibiotic treatment which may be necessary.  Regardless of the culture results, we will clean the inside of your nose with a betadine swab just before you have your surgery.      Will I get an infection if I have Staphylococcus aureus in my nose or on my skin?  Anyone can get an infection with Staphylococcus aureus.  However, the best way to reduce your risk of infection is to follow the instructions provided to you for the use of your CHG soap and dental rinse.        Who should I contact if I have any questions?  Call the [facility], Preadmission Testing at [phone] if you have any questions.           Patient Information: Oral/Dental Rinse  **This is a prescription; pick it up at your preferred local pharmacy **  What is oral/dental rinse?   It is a mouthwash. It is a way of cleaning the mouth with a germ killing solution before your surgery.  The solution contains chlorhexidine, commonly known as CHG.   It is used inside the mouth to kill a bacteria known as Staphylococcus aureus.  Let your doctor know if you are allergic to Chlorhexidine.    Why do I need to use CHG oral/dental rinse?  The CHG oral/dental rinse helps to kill a bacteria in your mouth known a Staphylococcus aureus.     This reduces the risk of infection at the surgical site.      Using your CHG oral/dental rinse  STEPS:  Use your CHG oral/dental rinse after you brush your teeth the night before (at bedtime) and the morning of your surgery.  Follow all directions on your prescription label.    Use the cap on the container to measure 15ml (fill cap to fill line)  Swish (gargle if you can) the mouthwash in your mouth for at least 30 seconds, (do not to swallow) spit out  After you use your CHG rinse, do not rinse your mouth with water, drink or eat.  Please refer to prescription label for the appropriate time to  resume oral intake  Dental rinse comes in one size bottle: 473ml ~16oz.  You will have leftover    rinse, discard after this use.    What side effects might I have using the CHG oral/dental rinse?  CHG rinse will stick to plaque on the teeth.  Brush and floss just before use.  Teeth brushing will help avoid staining of plaque during use.    Who should I contact if I have questions about the CHG oral/dental rinse?  Please call [facility], Preadmission Testing at [phone] if you have any questions

## 2024-07-25 LAB — STAPHYLOCOCCUS SPEC CULT: NORMAL

## 2024-07-30 NOTE — PROGRESS NOTES
Patient is a 58-year-old female who presents today for discussion of possible left total hip arthroplasty revision.  She has significant eccentric polyethylene wear with significant lysis on the osteotomy.  We previously revised her right side in 2019.  She is doing well from that standpoint.  She is relatively asymptomatic despite her radiographic appearance.     AAOx3, NAD, walks with a moderate antalgic gait  Moderate pain with flexion internal rotation  Positive Stinchfield  Mild TTP over trochanter laterally  5/5 Hip flexion/knee extension/df/pf/ehl  SILT in a nhung/saph/per/tib distribution  ½ dorsalis pedis and posterior tibial pulse  no popliteal or inguinal lymphadenopathy  no other overlying lesions  mood: euthymic  Respirations non     Plain films were reviewed by myself in clinic today.  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     Patient is now failed a greater than 3-month trial of reasonable conservative treatment and wishes to proceed with revision total hip arthroplasty which is indicated at this time.  We discussed the risk benefits alternatives to surgery.  All of their questions were answered.     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 I reviewed concerns about implant wear, loosening, breakage, infection, infection prophylaxis, DVT, PE, death and other medical and anesthetic complications of surgery. We talked about the potential for persistent pain following surgery since there are many possible causes for hip and leg pain. The patient was advised that hip replacement will only relieve pain that is coming from the hip. We  talked about leg length discrepancy, neurovascular problems and dislocation after surgery. The patient understands that we may have to lengthen the leg slightly to provide for adequate stability of the hip. I reviewed dislocation precautions and activity restrictions in detail. We discussed advantages and disadvantages of cemented and cementless implant fixation. We discussed the concerns about intraoperative fracture, ingrowth failure, thigh pain and possible post-operative weight bearing restrictions following cementless hip replacement. We discussed advantages and disadvantages of different surgical approaches. We discussed the possible need for a homologous blood transfusion. We discussed the fact that many of our patients are able to go home in 1 day or less depending on their health, mobility, pre-op preparation, individual home situation and personal preference. The patient has identified their personal goals of their joint replacement surgery and recovery and we have discussed them. These are documented in our disco volante patient engagement platform. In addition, we have discussed the advantages and disadvantages of various implant and fixation options, as well as various surgical approaches.  The basic concepts of the joint replacement procedure has been reviewed with the patient and the patient has been provided the opportunity to see an actual implant either in the office or in our pre-op education class. All of the patients questions were answered. The patient can call my office to schedule surgery and the pre-op teaching class. I told the patient that they should contact their primary care physician to discuss fitness for surgery.      This note was created using voice recognition software and was not corrected for typographical or grammatical errors.

## 2024-08-05 ENCOUNTER — ANESTHESIA EVENT (OUTPATIENT)
Dept: OPERATING ROOM | Facility: HOSPITAL | Age: 59
DRG: 470 | End: 2024-08-05
Payer: COMMERCIAL

## 2024-08-06 ENCOUNTER — APPOINTMENT (OUTPATIENT)
Dept: RADIOLOGY | Facility: HOSPITAL | Age: 59
DRG: 470 | End: 2024-08-06
Payer: COMMERCIAL

## 2024-08-06 ENCOUNTER — HOSPITAL ENCOUNTER (INPATIENT)
Facility: HOSPITAL | Age: 59
LOS: 1 days | Discharge: HOME | DRG: 470 | End: 2024-08-07
Attending: ORTHOPAEDIC SURGERY | Admitting: ORTHOPAEDIC SURGERY
Payer: COMMERCIAL

## 2024-08-06 ENCOUNTER — HOME HEALTH ADMISSION (OUTPATIENT)
Dept: HOME HEALTH SERVICES | Facility: HOME HEALTH | Age: 59
End: 2024-08-06
Payer: COMMERCIAL

## 2024-08-06 ENCOUNTER — ANESTHESIA (OUTPATIENT)
Dept: OPERATING ROOM | Facility: HOSPITAL | Age: 59
DRG: 470 | End: 2024-08-06
Payer: COMMERCIAL

## 2024-08-06 DIAGNOSIS — Z96.649 HISTORY OF TOTAL HIP REPLACEMENT: ICD-10-CM

## 2024-08-06 DIAGNOSIS — Z96.642 S/P TOTAL LEFT HIP ARTHROPLASTY: Primary | ICD-10-CM

## 2024-08-06 DIAGNOSIS — T84.018A: ICD-10-CM

## 2024-08-06 PROBLEM — M19.90 OSTEOARTHRITIS: Status: ACTIVE | Noted: 2024-08-06

## 2024-08-06 PROBLEM — M06.9 RHEUMATOID ARTHRITIS (MULTI): Status: ACTIVE | Noted: 2024-08-06

## 2024-08-06 PROBLEM — M16.12 UNILATERAL PRIMARY OSTEOARTHRITIS, LEFT HIP: Status: ACTIVE | Noted: 2024-08-06

## 2024-08-06 PROBLEM — K21.9 GASTROESOPHAGEAL REFLUX DISEASE: Status: ACTIVE | Noted: 2024-08-06

## 2024-08-06 LAB
ABO GROUP (TYPE) IN BLOOD: NORMAL
ANTIBODY SCREEN: NORMAL
RH FACTOR (ANTIGEN D): NORMAL

## 2024-08-06 PROCEDURE — 7100000002 HC RECOVERY ROOM TIME - EACH INCREMENTAL 1 MINUTE: Performed by: ORTHOPAEDIC SURGERY

## 2024-08-06 PROCEDURE — 2500000004 HC RX 250 GENERAL PHARMACY W/ HCPCS (ALT 636 FOR OP/ED): Performed by: NURSE ANESTHETIST, CERTIFIED REGISTERED

## 2024-08-06 PROCEDURE — 87102 FUNGUS ISOLATION CULTURE: CPT | Mod: AHULAB | Performed by: ORTHOPAEDIC SURGERY

## 2024-08-06 PROCEDURE — A27134 PR REVISE TOTAL HIP REPLACEMENT: Performed by: ANESTHESIOLOGY

## 2024-08-06 PROCEDURE — C1713 ANCHOR/SCREW BN/BN,TIS/BN: HCPCS | Performed by: ORTHOPAEDIC SURGERY

## 2024-08-06 PROCEDURE — 2500000001 HC RX 250 WO HCPCS SELF ADMINISTERED DRUGS (ALT 637 FOR MEDICARE OP): Performed by: PHYSICIAN ASSISTANT

## 2024-08-06 PROCEDURE — 97116 GAIT TRAINING THERAPY: CPT | Mod: GP

## 2024-08-06 PROCEDURE — C1762 CONN TISS, HUMAN(INC FASCIA): HCPCS | Performed by: ORTHOPAEDIC SURGERY

## 2024-08-06 PROCEDURE — 3600000017 HC OR TIME - EACH INCREMENTAL 1 MINUTE - PROCEDURE LEVEL SIX: Performed by: ORTHOPAEDIC SURGERY

## 2024-08-06 PROCEDURE — 1100000001 HC PRIVATE ROOM DAILY

## 2024-08-06 PROCEDURE — 72170 X-RAY EXAM OF PELVIS: CPT

## 2024-08-06 PROCEDURE — 2500000004 HC RX 250 GENERAL PHARMACY W/ HCPCS (ALT 636 FOR OP/ED): Performed by: ORTHOPAEDIC SURGERY

## 2024-08-06 PROCEDURE — 2720000007 HC OR 272 NO HCPCS: Performed by: ORTHOPAEDIC SURGERY

## 2024-08-06 PROCEDURE — 2500000004 HC RX 250 GENERAL PHARMACY W/ HCPCS (ALT 636 FOR OP/ED): Performed by: PHYSICIAN ASSISTANT

## 2024-08-06 PROCEDURE — 2780000003 HC OR 278 NO HCPCS: Performed by: ORTHOPAEDIC SURGERY

## 2024-08-06 PROCEDURE — 97110 THERAPEUTIC EXERCISES: CPT | Mod: GP

## 2024-08-06 PROCEDURE — 97161 PT EVAL LOW COMPLEX 20 MIN: CPT | Mod: GP

## 2024-08-06 PROCEDURE — 3700000002 HC GENERAL ANESTHESIA TIME - EACH INCREMENTAL 1 MINUTE: Performed by: ORTHOPAEDIC SURGERY

## 2024-08-06 PROCEDURE — 3600000018 HC OR TIME - INITIAL BASE CHARGE - PROCEDURE LEVEL SIX: Performed by: ORTHOPAEDIC SURGERY

## 2024-08-06 PROCEDURE — A27134 PR REVISE TOTAL HIP REPLACEMENT: Performed by: NURSE ANESTHETIST, CERTIFIED REGISTERED

## 2024-08-06 PROCEDURE — 2500000002 HC RX 250 W HCPCS SELF ADMINISTERED DRUGS (ALT 637 FOR MEDICARE OP, ALT 636 FOR OP/ED): Performed by: PHYSICIAN ASSISTANT

## 2024-08-06 PROCEDURE — 2500000001 HC RX 250 WO HCPCS SELF ADMINISTERED DRUGS (ALT 637 FOR MEDICARE OP)

## 2024-08-06 PROCEDURE — 27134 REVISE HIP JOINT REPLACEMENT: CPT | Performed by: PHYSICIAN ASSISTANT

## 2024-08-06 PROCEDURE — 2500000005 HC RX 250 GENERAL PHARMACY W/O HCPCS: Performed by: NURSE ANESTHETIST, CERTIFIED REGISTERED

## 2024-08-06 PROCEDURE — 2500000001 HC RX 250 WO HCPCS SELF ADMINISTERED DRUGS (ALT 637 FOR MEDICARE OP): Performed by: ANESTHESIOLOGY

## 2024-08-06 PROCEDURE — 3700000001 HC GENERAL ANESTHESIA TIME - INITIAL BASE CHARGE: Performed by: ORTHOPAEDIC SURGERY

## 2024-08-06 PROCEDURE — 72170 X-RAY EXAM OF PELVIS: CPT | Performed by: RADIOLOGY

## 2024-08-06 PROCEDURE — 87205 SMEAR GRAM STAIN: CPT | Mod: AHULAB | Performed by: ORTHOPAEDIC SURGERY

## 2024-08-06 PROCEDURE — 0SRS0JZ REPLACEMENT OF LEFT HIP JOINT, FEMORAL SURFACE WITH SYNTHETIC SUBSTITUTE, OPEN APPROACH: ICD-10-PCS | Performed by: ORTHOPAEDIC SURGERY

## 2024-08-06 PROCEDURE — 2500000005 HC RX 250 GENERAL PHARMACY W/O HCPCS: Performed by: PHYSICIAN ASSISTANT

## 2024-08-06 PROCEDURE — RXMED WILLOW AMBULATORY MEDICATION CHARGE

## 2024-08-06 PROCEDURE — 2500000005 HC RX 250 GENERAL PHARMACY W/O HCPCS: Performed by: ANESTHESIOLOGY

## 2024-08-06 PROCEDURE — 86901 BLOOD TYPING SEROLOGIC RH(D): CPT | Performed by: ANESTHESIOLOGY

## 2024-08-06 PROCEDURE — 27134 REVISE HIP JOINT REPLACEMENT: CPT | Performed by: ORTHOPAEDIC SURGERY

## 2024-08-06 PROCEDURE — C1776 JOINT DEVICE (IMPLANTABLE): HCPCS | Performed by: ORTHOPAEDIC SURGERY

## 2024-08-06 PROCEDURE — 36415 COLL VENOUS BLD VENIPUNCTURE: CPT | Performed by: ANESTHESIOLOGY

## 2024-08-06 PROCEDURE — 88332 PATH CONSLTJ SURG EA ADD BLK: CPT | Mod: TC,SUR,AHULAB | Performed by: ORTHOPAEDIC SURGERY

## 2024-08-06 PROCEDURE — 88305 TISSUE EXAM BY PATHOLOGIST: CPT | Mod: TC,AHULAB | Performed by: ORTHOPAEDIC SURGERY

## 2024-08-06 PROCEDURE — 9420000001 HC RT PATIENT EDUCATION 5 MIN

## 2024-08-06 PROCEDURE — 2500000004 HC RX 250 GENERAL PHARMACY W/ HCPCS (ALT 636 FOR OP/ED): Performed by: ANESTHESIOLOGY

## 2024-08-06 PROCEDURE — 7100000001 HC RECOVERY ROOM TIME - INITIAL BASE CHARGE: Performed by: ORTHOPAEDIC SURGERY

## 2024-08-06 PROCEDURE — 87070 CULTURE OTHR SPECIMN AEROBIC: CPT | Mod: AHULAB | Performed by: ORTHOPAEDIC SURGERY

## 2024-08-06 DEVICE — IMPLANTABLE DEVICE
Type: IMPLANTABLE DEVICE | Site: HIP | Status: FUNCTIONAL
Brand: G7® DUAL MOBILITY ACETABULAR SYSTEM

## 2024-08-06 DEVICE — IMPLANTABLE DEVICE: Type: IMPLANTABLE DEVICE | Site: HIP | Status: FUNCTIONAL

## 2024-08-06 DEVICE — BONE CHIPS,  CANCELL 30CC 1.7-10MM: Type: IMPLANTABLE DEVICE | Site: HIP | Status: FUNCTIONAL

## 2024-08-06 DEVICE — BONE, CHIP, CANCELLOUS, 1.7-10 MM, 15 CC: Type: IMPLANTABLE DEVICE | Site: HIP | Status: FUNCTIONAL

## 2024-08-06 RX ORDER — TRANEXAMIC ACID 650 MG/1
1950 TABLET ORAL ONCE
Status: COMPLETED | OUTPATIENT
Start: 2024-08-06 | End: 2024-08-06

## 2024-08-06 RX ORDER — CEFAZOLIN SODIUM 2 G/100ML
2 INJECTION, SOLUTION INTRAVENOUS ONCE
Status: DISCONTINUED | OUTPATIENT
Start: 2024-08-06 | End: 2024-08-06

## 2024-08-06 RX ORDER — ONDANSETRON HYDROCHLORIDE 2 MG/ML
4 INJECTION, SOLUTION INTRAVENOUS EVERY 8 HOURS PRN
Status: DISCONTINUED | OUTPATIENT
Start: 2024-08-06 | End: 2024-08-07 | Stop reason: HOSPADM

## 2024-08-06 RX ORDER — SODIUM CHLORIDE, SODIUM LACTATE, POTASSIUM CHLORIDE, CALCIUM CHLORIDE 600; 310; 30; 20 MG/100ML; MG/100ML; MG/100ML; MG/100ML
100 INJECTION, SOLUTION INTRAVENOUS CONTINUOUS
Status: DISCONTINUED | OUTPATIENT
Start: 2024-08-06 | End: 2024-08-06 | Stop reason: HOSPADM

## 2024-08-06 RX ORDER — PANTOPRAZOLE SODIUM 40 MG/1
40 TABLET, DELAYED RELEASE ORAL
Status: DISCONTINUED | OUTPATIENT
Start: 2024-08-07 | End: 2024-08-07 | Stop reason: HOSPADM

## 2024-08-06 RX ORDER — LOSARTAN POTASSIUM 50 MG/1
100 TABLET ORAL DAILY
Status: DISCONTINUED | OUTPATIENT
Start: 2024-08-06 | End: 2024-08-07 | Stop reason: HOSPADM

## 2024-08-06 RX ORDER — MEPERIDINE HYDROCHLORIDE 25 MG/ML
12.5 INJECTION INTRAMUSCULAR; INTRAVENOUS; SUBCUTANEOUS EVERY 10 MIN PRN
Status: DISCONTINUED | OUTPATIENT
Start: 2024-08-06 | End: 2024-08-06 | Stop reason: HOSPADM

## 2024-08-06 RX ORDER — SCOLOPAMINE TRANSDERMAL SYSTEM 1 MG/1
1 PATCH, EXTENDED RELEASE TRANSDERMAL
Status: DISCONTINUED | OUTPATIENT
Start: 2024-08-06 | End: 2024-08-06

## 2024-08-06 RX ORDER — ROCURONIUM BROMIDE 10 MG/ML
INJECTION, SOLUTION INTRAVENOUS AS NEEDED
Status: DISCONTINUED | OUTPATIENT
Start: 2024-08-06 | End: 2024-08-06

## 2024-08-06 RX ORDER — KETOROLAC TROMETHAMINE 30 MG/ML
INJECTION, SOLUTION INTRAMUSCULAR; INTRAVENOUS AS NEEDED
Status: DISCONTINUED | OUTPATIENT
Start: 2024-08-06 | End: 2024-08-06

## 2024-08-06 RX ORDER — METOCLOPRAMIDE HYDROCHLORIDE 5 MG/ML
10 INJECTION INTRAMUSCULAR; INTRAVENOUS EVERY 6 HOURS PRN
Status: DISCONTINUED | OUTPATIENT
Start: 2024-08-06 | End: 2024-08-07 | Stop reason: HOSPADM

## 2024-08-06 RX ORDER — MIDAZOLAM HYDROCHLORIDE 1 MG/ML
INJECTION INTRAMUSCULAR; INTRAVENOUS AS NEEDED
Status: DISCONTINUED | OUTPATIENT
Start: 2024-08-06 | End: 2024-08-06

## 2024-08-06 RX ORDER — OXYCODONE HYDROCHLORIDE 5 MG/1
5 TABLET ORAL EVERY 4 HOURS PRN
Status: DISCONTINUED | OUTPATIENT
Start: 2024-08-06 | End: 2024-08-07 | Stop reason: HOSPADM

## 2024-08-06 RX ORDER — DIPHENHYDRAMINE HCL 12.5MG/5ML
12.5 LIQUID (ML) ORAL EVERY 6 HOURS PRN
Status: DISCONTINUED | OUTPATIENT
Start: 2024-08-06 | End: 2024-08-07 | Stop reason: HOSPADM

## 2024-08-06 RX ORDER — POLYETHYLENE GLYCOL 3350 17 G/17G
17 POWDER, FOR SOLUTION ORAL DAILY
Status: DISCONTINUED | OUTPATIENT
Start: 2024-08-06 | End: 2024-08-07 | Stop reason: HOSPADM

## 2024-08-06 RX ORDER — KETOROLAC TROMETHAMINE 30 MG/ML
15 INJECTION, SOLUTION INTRAMUSCULAR; INTRAVENOUS EVERY 6 HOURS
Status: COMPLETED | OUTPATIENT
Start: 2024-08-06 | End: 2024-08-07

## 2024-08-06 RX ORDER — ONDANSETRON HYDROCHLORIDE 2 MG/ML
INJECTION, SOLUTION INTRAVENOUS AS NEEDED
Status: DISCONTINUED | OUTPATIENT
Start: 2024-08-06 | End: 2024-08-06

## 2024-08-06 RX ORDER — TRANEXAMIC ACID 100 MG/ML
INJECTION, SOLUTION INTRAVENOUS AS NEEDED
Status: DISCONTINUED | OUTPATIENT
Start: 2024-08-06 | End: 2024-08-06

## 2024-08-06 RX ORDER — CEFAZOLIN SODIUM 2 G/100ML
2 INJECTION, SOLUTION INTRAVENOUS EVERY 6 HOURS
Status: COMPLETED | OUTPATIENT
Start: 2024-08-06 | End: 2024-08-07

## 2024-08-06 RX ORDER — OXYBUTYNIN CHLORIDE 5 MG/1
5 TABLET, EXTENDED RELEASE ORAL NIGHTLY
Status: DISCONTINUED | OUTPATIENT
Start: 2024-08-06 | End: 2024-08-06

## 2024-08-06 RX ORDER — HYDROCHLOROTHIAZIDE 12.5 MG/1
12.5 TABLET ORAL DAILY
Status: DISCONTINUED | OUTPATIENT
Start: 2024-08-06 | End: 2024-08-07 | Stop reason: HOSPADM

## 2024-08-06 RX ORDER — ACETAMINOPHEN 325 MG/1
975 TABLET ORAL ONCE
Status: COMPLETED | OUTPATIENT
Start: 2024-08-06 | End: 2024-08-06

## 2024-08-06 RX ORDER — ACETAMINOPHEN 500 MG
1000 TABLET ORAL EVERY 6 HOURS PRN
Qty: 240 TABLET | Refills: 0 | Status: SHIPPED | OUTPATIENT
Start: 2024-08-06 | End: 2024-09-06

## 2024-08-06 RX ORDER — NALOXONE HYDROCHLORIDE 0.4 MG/ML
0.2 INJECTION, SOLUTION INTRAMUSCULAR; INTRAVENOUS; SUBCUTANEOUS EVERY 5 MIN PRN
Status: DISCONTINUED | OUTPATIENT
Start: 2024-08-06 | End: 2024-08-07 | Stop reason: HOSPADM

## 2024-08-06 RX ORDER — PREGABALIN 75 MG/1
75 CAPSULE ORAL ONCE
Status: COMPLETED | OUTPATIENT
Start: 2024-08-06 | End: 2024-08-06

## 2024-08-06 RX ORDER — SODIUM CHLORIDE, SODIUM LACTATE, POTASSIUM CHLORIDE, CALCIUM CHLORIDE 600; 310; 30; 20 MG/100ML; MG/100ML; MG/100ML; MG/100ML
75 INJECTION, SOLUTION INTRAVENOUS CONTINUOUS
Status: DISCONTINUED | OUTPATIENT
Start: 2024-08-06 | End: 2024-08-07 | Stop reason: HOSPADM

## 2024-08-06 RX ORDER — LIDOCAINE HYDROCHLORIDE 10 MG/ML
0.1 INJECTION, SOLUTION EPIDURAL; INFILTRATION; INTRACAUDAL; PERINEURAL ONCE
Status: DISCONTINUED | OUTPATIENT
Start: 2024-08-06 | End: 2024-08-06 | Stop reason: HOSPADM

## 2024-08-06 RX ORDER — LIDOCAINE HYDROCHLORIDE 20 MG/ML
INJECTION, SOLUTION EPIDURAL; INFILTRATION; INTRACAUDAL; PERINEURAL AS NEEDED
Status: DISCONTINUED | OUTPATIENT
Start: 2024-08-06 | End: 2024-08-06

## 2024-08-06 RX ORDER — POLYETHYLENE GLYCOL 3350 17 G/17G
17 POWDER, FOR SOLUTION ORAL DAILY
Qty: 238 G | Refills: 0 | Status: SHIPPED | OUTPATIENT
Start: 2024-08-06

## 2024-08-06 RX ORDER — HYDROMORPHONE HYDROCHLORIDE 1 MG/ML
INJECTION, SOLUTION INTRAMUSCULAR; INTRAVENOUS; SUBCUTANEOUS AS NEEDED
Status: DISCONTINUED | OUTPATIENT
Start: 2024-08-06 | End: 2024-08-06

## 2024-08-06 RX ORDER — DOCUSATE SODIUM 100 MG/1
100 CAPSULE, LIQUID FILLED ORAL 2 TIMES DAILY
Status: DISCONTINUED | OUTPATIENT
Start: 2024-08-06 | End: 2024-08-07 | Stop reason: HOSPADM

## 2024-08-06 RX ORDER — OXYCODONE HYDROCHLORIDE 5 MG/1
5 TABLET ORAL EVERY 4 HOURS PRN
Status: DISCONTINUED | OUTPATIENT
Start: 2024-08-06 | End: 2024-08-06 | Stop reason: HOSPADM

## 2024-08-06 RX ORDER — ONDANSETRON 4 MG/1
4 TABLET, FILM COATED ORAL EVERY 8 HOURS PRN
Status: DISCONTINUED | OUTPATIENT
Start: 2024-08-06 | End: 2024-08-07 | Stop reason: HOSPADM

## 2024-08-06 RX ORDER — DOCUSATE SODIUM 100 MG/1
100 CAPSULE, LIQUID FILLED ORAL 2 TIMES DAILY
Qty: 60 CAPSULE | Refills: 0 | Status: SHIPPED | OUTPATIENT
Start: 2024-08-06 | End: 2024-09-06

## 2024-08-06 RX ORDER — LEVOTHYROXINE SODIUM 112 UG/1
112 TABLET ORAL EVERY 24 HOURS
Status: DISCONTINUED | OUTPATIENT
Start: 2024-08-07 | End: 2024-08-07 | Stop reason: HOSPADM

## 2024-08-06 RX ORDER — ACETAMINOPHEN 325 MG/1
650 TABLET ORAL EVERY 6 HOURS PRN
Status: DISCONTINUED | OUTPATIENT
Start: 2024-08-06 | End: 2024-08-07 | Stop reason: HOSPADM

## 2024-08-06 RX ORDER — BISACODYL 5 MG
10 TABLET, DELAYED RELEASE (ENTERIC COATED) ORAL DAILY PRN
Status: DISCONTINUED | OUTPATIENT
Start: 2024-08-06 | End: 2024-08-07 | Stop reason: HOSPADM

## 2024-08-06 RX ORDER — OXYBUTYNIN CHLORIDE 5 MG/1
5 TABLET ORAL NIGHTLY
Status: DISCONTINUED | OUTPATIENT
Start: 2024-08-06 | End: 2024-08-07 | Stop reason: HOSPADM

## 2024-08-06 RX ORDER — OXYCODONE HYDROCHLORIDE 5 MG/1
5 TABLET ORAL EVERY 6 HOURS PRN
Qty: 28 TABLET | Refills: 0 | Status: SHIPPED | OUTPATIENT
Start: 2024-08-06 | End: 2024-08-14

## 2024-08-06 RX ORDER — ROPIVACAINE/EPI/CLONIDINE/KET 2.46-0.005
SYRINGE (ML) INJECTION AS NEEDED
Status: DISCONTINUED | OUTPATIENT
Start: 2024-08-06 | End: 2024-08-06 | Stop reason: HOSPADM

## 2024-08-06 RX ORDER — PROPOFOL 10 MG/ML
INJECTION, EMULSION INTRAVENOUS AS NEEDED
Status: DISCONTINUED | OUTPATIENT
Start: 2024-08-06 | End: 2024-08-06

## 2024-08-06 RX ORDER — HYDROMORPHONE HYDROCHLORIDE 1 MG/ML
1 INJECTION, SOLUTION INTRAMUSCULAR; INTRAVENOUS; SUBCUTANEOUS EVERY 2 HOUR PRN
Status: DISCONTINUED | OUTPATIENT
Start: 2024-08-06 | End: 2024-08-07 | Stop reason: HOSPADM

## 2024-08-06 RX ORDER — SODIUM CHLORIDE, SODIUM LACTATE, POTASSIUM CHLORIDE, CALCIUM CHLORIDE 600; 310; 30; 20 MG/100ML; MG/100ML; MG/100ML; MG/100ML
50 INJECTION, SOLUTION INTRAVENOUS CONTINUOUS
Status: DISCONTINUED | OUTPATIENT
Start: 2024-08-06 | End: 2024-08-07 | Stop reason: HOSPADM

## 2024-08-06 RX ORDER — SODIUM CHLORIDE, SODIUM LACTATE, POTASSIUM CHLORIDE, CALCIUM CHLORIDE 600; 310; 30; 20 MG/100ML; MG/100ML; MG/100ML; MG/100ML
100 INJECTION, SOLUTION INTRAVENOUS CONTINUOUS
Status: DISCONTINUED | OUTPATIENT
Start: 2024-08-06 | End: 2024-08-06

## 2024-08-06 RX ORDER — FENTANYL CITRATE 50 UG/ML
INJECTION, SOLUTION INTRAMUSCULAR; INTRAVENOUS AS NEEDED
Status: DISCONTINUED | OUTPATIENT
Start: 2024-08-06 | End: 2024-08-06

## 2024-08-06 RX ORDER — ONDANSETRON HYDROCHLORIDE 2 MG/ML
4 INJECTION, SOLUTION INTRAVENOUS ONCE AS NEEDED
Status: DISCONTINUED | OUTPATIENT
Start: 2024-08-06 | End: 2024-08-06 | Stop reason: HOSPADM

## 2024-08-06 RX ORDER — TRAMADOL HYDROCHLORIDE 50 MG/1
50 TABLET ORAL EVERY 6 HOURS PRN
Qty: 28 TABLET | Refills: 0 | Status: SHIPPED | OUTPATIENT
Start: 2024-08-06 | End: 2024-08-14

## 2024-08-06 RX ORDER — CEFAZOLIN 1 G/1
INJECTION, POWDER, FOR SOLUTION INTRAVENOUS AS NEEDED
Status: DISCONTINUED | OUTPATIENT
Start: 2024-08-06 | End: 2024-08-06

## 2024-08-06 RX ORDER — OXYCODONE HYDROCHLORIDE 5 MG/1
10 TABLET ORAL EVERY 4 HOURS PRN
Status: DISCONTINUED | OUTPATIENT
Start: 2024-08-06 | End: 2024-08-07 | Stop reason: HOSPADM

## 2024-08-06 RX ORDER — CALCIUM CARBONATE 200(500)MG
1000 TABLET,CHEWABLE ORAL 4 TIMES DAILY PRN
Status: DISCONTINUED | OUTPATIENT
Start: 2024-08-06 | End: 2024-08-07 | Stop reason: HOSPADM

## 2024-08-06 RX ORDER — AMLODIPINE BESYLATE 5 MG/1
5 TABLET ORAL DAILY
Status: DISCONTINUED | OUTPATIENT
Start: 2024-08-07 | End: 2024-08-07 | Stop reason: HOSPADM

## 2024-08-06 RX ORDER — LOSARTAN POTASSIUM AND HYDROCHLOROTHIAZIDE 12.5; 1 MG/1; MG/1
1 TABLET ORAL EVERY 24 HOURS
Status: DISCONTINUED | OUTPATIENT
Start: 2024-08-06 | End: 2024-08-06

## 2024-08-06 RX ORDER — CYCLOBENZAPRINE HCL 5 MG
5 TABLET ORAL 3 TIMES DAILY PRN
Status: DISCONTINUED | OUTPATIENT
Start: 2024-08-06 | End: 2024-08-07 | Stop reason: HOSPADM

## 2024-08-06 RX ORDER — OXYCODONE HCL 10 MG/1
10 TABLET, FILM COATED, EXTENDED RELEASE ORAL ONCE
Status: COMPLETED | OUTPATIENT
Start: 2024-08-06 | End: 2024-08-06

## 2024-08-06 RX ORDER — PANTOPRAZOLE SODIUM 40 MG/1
40 TABLET, DELAYED RELEASE ORAL DAILY
Qty: 30 TABLET | Refills: 0 | Status: SHIPPED | OUTPATIENT
Start: 2024-08-06 | End: 2024-09-06

## 2024-08-06 RX ORDER — BISACODYL 10 MG/1
10 SUPPOSITORY RECTAL DAILY PRN
Status: DISCONTINUED | OUTPATIENT
Start: 2024-08-06 | End: 2024-08-07 | Stop reason: HOSPADM

## 2024-08-06 RX ORDER — METOCLOPRAMIDE 10 MG/1
10 TABLET ORAL EVERY 6 HOURS PRN
Status: DISCONTINUED | OUTPATIENT
Start: 2024-08-06 | End: 2024-08-07 | Stop reason: HOSPADM

## 2024-08-06 SDOH — ECONOMIC STABILITY: INCOME INSECURITY: HOW HARD IS IT FOR YOU TO PAY FOR THE VERY BASICS LIKE FOOD, HOUSING, MEDICAL CARE, AND HEATING?: NOT HARD AT ALL

## 2024-08-06 SDOH — SOCIAL STABILITY: SOCIAL INSECURITY: HAS ANYONE EVER THREATENED TO HURT YOUR FAMILY OR YOUR PETS?: NO

## 2024-08-06 SDOH — ECONOMIC STABILITY: INCOME INSECURITY: IN THE LAST 12 MONTHS, WAS THERE A TIME WHEN YOU WERE NOT ABLE TO PAY THE MORTGAGE OR RENT ON TIME?: NO

## 2024-08-06 SDOH — ECONOMIC STABILITY: TRANSPORTATION INSECURITY
IN THE PAST 12 MONTHS, HAS LACK OF TRANSPORTATION KEPT YOU FROM MEETINGS, WORK, OR FROM GETTING THINGS NEEDED FOR DAILY LIVING?: NO

## 2024-08-06 SDOH — HEALTH STABILITY: MENTAL HEALTH: HOW OFTEN DO YOU HAVE A DRINK CONTAINING ALCOHOL?: NEVER

## 2024-08-06 SDOH — SOCIAL STABILITY: SOCIAL INSECURITY: ABUSE: ADULT

## 2024-08-06 SDOH — ECONOMIC STABILITY: INCOME INSECURITY: IN THE PAST 12 MONTHS, HAS THE ELECTRIC, GAS, OIL, OR WATER COMPANY THREATENED TO SHUT OFF SERVICE IN YOUR HOME?: NO

## 2024-08-06 SDOH — HEALTH STABILITY: MENTAL HEALTH: HOW OFTEN DO YOU HAVE 6 OR MORE DRINKS ON ONE OCCASION?: NEVER

## 2024-08-06 SDOH — SOCIAL STABILITY: SOCIAL INSECURITY: WERE YOU ABLE TO COMPLETE ALL THE BEHAVIORAL HEALTH SCREENINGS?: YES

## 2024-08-06 SDOH — HEALTH STABILITY: MENTAL HEALTH: HOW MANY STANDARD DRINKS CONTAINING ALCOHOL DO YOU HAVE ON A TYPICAL DAY?: PATIENT DOES NOT DRINK

## 2024-08-06 SDOH — SOCIAL STABILITY: SOCIAL INSECURITY: HAVE YOU HAD THOUGHTS OF HARMING ANYONE ELSE?: NO

## 2024-08-06 SDOH — ECONOMIC STABILITY: HOUSING INSECURITY: AT ANY TIME IN THE PAST 12 MONTHS, WERE YOU HOMELESS OR LIVING IN A SHELTER (INCLUDING NOW)?: NO

## 2024-08-06 SDOH — SOCIAL STABILITY: SOCIAL INSECURITY: ARE THERE ANY APPARENT SIGNS OF INJURIES/BEHAVIORS THAT COULD BE RELATED TO ABUSE/NEGLECT?: NO

## 2024-08-06 SDOH — ECONOMIC STABILITY: TRANSPORTATION INSECURITY
IN THE PAST 12 MONTHS, HAS THE LACK OF TRANSPORTATION KEPT YOU FROM MEDICAL APPOINTMENTS OR FROM GETTING MEDICATIONS?: NO

## 2024-08-06 SDOH — ECONOMIC STABILITY: HOUSING INSECURITY: IN THE PAST 12 MONTHS, HOW MANY TIMES HAVE YOU MOVED WHERE YOU WERE LIVING?: 1

## 2024-08-06 SDOH — SOCIAL STABILITY: SOCIAL INSECURITY: DO YOU FEEL ANYONE HAS EXPLOITED OR TAKEN ADVANTAGE OF YOU FINANCIALLY OR OF YOUR PERSONAL PROPERTY?: NO

## 2024-08-06 SDOH — SOCIAL STABILITY: SOCIAL INSECURITY: DO YOU FEEL UNSAFE GOING BACK TO THE PLACE WHERE YOU ARE LIVING?: NO

## 2024-08-06 SDOH — SOCIAL STABILITY: SOCIAL INSECURITY: DOES ANYONE TRY TO KEEP YOU FROM HAVING/CONTACTING OTHER FRIENDS OR DOING THINGS OUTSIDE YOUR HOME?: NO

## 2024-08-06 SDOH — SOCIAL STABILITY: SOCIAL INSECURITY: ARE YOU OR HAVE YOU BEEN THREATENED OR ABUSED PHYSICALLY, EMOTIONALLY, OR SEXUALLY BY ANYONE?: NO

## 2024-08-06 SDOH — SOCIAL STABILITY: SOCIAL INSECURITY: HAVE YOU HAD ANY THOUGHTS OF HARMING ANYONE ELSE?: NO

## 2024-08-06 ASSESSMENT — PAIN - FUNCTIONAL ASSESSMENT
PAIN_FUNCTIONAL_ASSESSMENT: 0-10
PAIN_FUNCTIONAL_ASSESSMENT: UNABLE TO SELF-REPORT
PAIN_FUNCTIONAL_ASSESSMENT: 0-10

## 2024-08-06 ASSESSMENT — PAIN SCALES - GENERAL
PAINLEVEL_OUTOF10: 5 - MODERATE PAIN
PAINLEVEL_OUTOF10: 3
PAINLEVEL_OUTOF10: 3
PAINLEVEL_OUTOF10: 2
PAINLEVEL_OUTOF10: 3
PAINLEVEL_OUTOF10: 4
PAINLEVEL_OUTOF10: 2
PAINLEVEL_OUTOF10: 5 - MODERATE PAIN
PAINLEVEL_OUTOF10: 0 - NO PAIN
PAINLEVEL_OUTOF10: 5 - MODERATE PAIN
PAINLEVEL_OUTOF10: 3
PAIN_LEVEL: 0
PAINLEVEL_OUTOF10: 5 - MODERATE PAIN
PAINLEVEL_OUTOF10: 3

## 2024-08-06 ASSESSMENT — COGNITIVE AND FUNCTIONAL STATUS - GENERAL
TURNING FROM BACK TO SIDE WHILE IN FLAT BAD: A LITTLE
MOBILITY SCORE: 17
MOVING TO AND FROM BED TO CHAIR: A LITTLE
DRESSING REGULAR LOWER BODY CLOTHING: A LITTLE
DRESSING REGULAR LOWER BODY CLOTHING: A LITTLE
MOBILITY SCORE: 17
DAILY ACTIVITIY SCORE: 21
HELP NEEDED FOR BATHING: A LITTLE
TOILETING: A LITTLE
STANDING UP FROM CHAIR USING ARMS: A LITTLE
PATIENT BASELINE BEDBOUND: NO
CLIMB 3 TO 5 STEPS WITH RAILING: A LOT
MOVING TO AND FROM BED TO CHAIR: A LITTLE
MOBILITY SCORE: 24
CLIMB 3 TO 5 STEPS WITH RAILING: A LOT
DAILY ACTIVITIY SCORE: 23
WALKING IN HOSPITAL ROOM: A LITTLE
WALKING IN HOSPITAL ROOM: A LITTLE
MOVING FROM LYING ON BACK TO SITTING ON SIDE OF FLAT BED WITH BEDRAILS: A LITTLE
MOVING FROM LYING ON BACK TO SITTING ON SIDE OF FLAT BED WITH BEDRAILS: A LITTLE
STANDING UP FROM CHAIR USING ARMS: A LITTLE
TURNING FROM BACK TO SIDE WHILE IN FLAT BAD: A LITTLE

## 2024-08-06 ASSESSMENT — ACTIVITIES OF DAILY LIVING (ADL)
GROOMING: INDEPENDENT
ADL_ASSISTANCE: INDEPENDENT
PATIENT'S MEMORY ADEQUATE TO SAFELY COMPLETE DAILY ACTIVITIES?: YES
HEARING - RIGHT EAR: FUNCTIONAL
TOILETING: INDEPENDENT
BATHING: INDEPENDENT
HEARING - LEFT EAR: FUNCTIONAL
WALKS IN HOME: INDEPENDENT
ASSISTIVE_DEVICE: EYEGLASSES
DRESSING YOURSELF: INDEPENDENT
FEEDING YOURSELF: INDEPENDENT
JUDGMENT_ADEQUATE_SAFELY_COMPLETE_DAILY_ACTIVITIES: YES
ADEQUATE_TO_COMPLETE_ADL: YES

## 2024-08-06 ASSESSMENT — LIFESTYLE VARIABLES
HOW OFTEN DO YOU HAVE A DRINK CONTAINING ALCOHOL: 2-3 TIMES A WEEK
HOW OFTEN DO YOU HAVE 6 OR MORE DRINKS ON ONE OCCASION: NEVER
HOW OFTEN DURING THE LAST YEAR HAVE YOU FOUND THAT YOU WERE NOT ABLE TO STOP DRINKING ONCE YOU HAD STARTED: NEVER
AUDIT-C TOTAL SCORE: 4
HOW OFTEN DURING THE LAST YEAR HAVE YOU HAD A FEELING OF GUILT OR REMORSE AFTER DRINKING: NEVER
HOW OFTEN DURING THE LAST YEAR HAVE YOU NEEDED AN ALCOHOLIC DRINK FIRST THING IN THE MORNING TO GET YOURSELF GOING AFTER A NIGHT OF HEAVY DRINKING: NEVER
HOW MANY STANDARD DRINKS CONTAINING ALCOHOL DO YOU HAVE ON A TYPICAL DAY: 3 OR 4
SUBSTANCE_ABUSE_PAST_12_MONTHS: NO
AUDIT-C TOTAL SCORE: 4
HOW OFTEN DURING THE LAST YEAR HAVE YOU BEEN UNABLE TO REMEMBER WHAT HAPPENED THE NIGHT BEFORE BECAUSE YOU HAD BEEN DRINKING: NEVER
AUDIT TOTAL SCORE: 0
SKIP TO QUESTIONS 9-10: 0
SKIP TO QUESTIONS 9-10: 1
PRESCIPTION_ABUSE_PAST_12_MONTHS: NO
HAS A RELATIVE, FRIEND, DOCTOR, OR ANOTHER HEALTH PROFESSIONAL EXPRESSED CONCERN ABOUT YOUR DRINKING OR SUGGESTED YOU CUT DOWN: NO
AUDIT-C TOTAL SCORE: 0
HOW OFTEN DURING THE LAST YEAR HAVE YOU FAILED TO DO WHAT WAS NORMALLY EXPECTED FROM YOU BECAUSE OF DRINKING: NEVER
AUDIT TOTAL SCORE: 4
HAVE YOU OR SOMEONE ELSE BEEN INJURED AS A RESULT OF YOUR DRINKING: NO

## 2024-08-06 ASSESSMENT — PATIENT HEALTH QUESTIONNAIRE - PHQ9
SUM OF ALL RESPONSES TO PHQ9 QUESTIONS 1 & 2: 0
1. LITTLE INTEREST OR PLEASURE IN DOING THINGS: NOT AT ALL
2. FEELING DOWN, DEPRESSED OR HOPELESS: NOT AT ALL

## 2024-08-06 ASSESSMENT — PAIN DESCRIPTION - LOCATION
LOCATION: HIP
LOCATION: HIP

## 2024-08-06 ASSESSMENT — COLUMBIA-SUICIDE SEVERITY RATING SCALE - C-SSRS
2. HAVE YOU ACTUALLY HAD ANY THOUGHTS OF KILLING YOURSELF?: NO
6. HAVE YOU EVER DONE ANYTHING, STARTED TO DO ANYTHING, OR PREPARED TO DO ANYTHING TO END YOUR LIFE?: NO
1. IN THE PAST MONTH, HAVE YOU WISHED YOU WERE DEAD OR WISHED YOU COULD GO TO SLEEP AND NOT WAKE UP?: NO

## 2024-08-06 ASSESSMENT — PAIN DESCRIPTION - ORIENTATION
ORIENTATION: LEFT
ORIENTATION: LEFT

## 2024-08-06 NOTE — ANESTHESIA PREPROCEDURE EVALUATION
Patient: Ashley Ngo    Procedure Information       Date/Time: 08/06/24 0830    Procedure: Left Hip Total Arthroplasty Revision (Left: Hip)    Location: [facility] OR [facility] / [facility]    Surgeons: Everett Muniz MD            Relevant Problems   Anesthesia (within normal limits)      Cardiac   (+) Hypertension      Pulmonary (within normal limits)      Neuro (within normal limits)   (-) Multiple sclerosis (Multi)      GI   (+) Gastroesophageal reflux disease      /Renal (within normal limits)      Liver (within normal limits)      Endocrine   (+) Hypothyroid      Hematology (within normal limits)      Musculoskeletal   (+) Osteoarthritis   (+) Rheumatoid arthritis (Multi)       Clinical information reviewed:   Tobacco  Allergies    Med Hx  Surg Hx  OB Status  Fam Hx  Soc Hx        NPO Detail:  NPO/Void Status  Carbohydrate Drink Given Prior to Surgery? : N  Date of Last Liquid: 08/06/24  Time of Last Liquid: 0500  Date of Last Solid: 08/05/24  Time of Last Solid: 1800  Last Intake Type: Clear fluids (water with am meds)  Time of Last Void: 0600         Physical Exam    Airway  Mallampati: II     Cardiovascular    Dental    Pulmonary    Abdominal            Anesthesia Plan    History of general anesthesia?: yes  History of complications of general anesthesia?: no    ASA 2     general     intravenous induction   Anesthetic plan and risks discussed with patient.

## 2024-08-06 NOTE — ANESTHESIA PROCEDURE NOTES
Airway  Date/Time: 8/6/2024 8:52 AM  Urgency: elective    Airway not difficult    Staffing  Performed: CRNA   Authorized by: Anjum Tovar MD    Performed by: EILEEN Zamorano-JONATHAN  Patient location during procedure: OR    Indications and Patient Condition  Indications for airway management: anesthesia  Spontaneous ventilation: present  Sedation level: minimal  Preoxygenated: yes  Patient position: sniffing  MILS maintained throughout  Mask difficulty assessment: 1 - vent by mask  Planned trial extubation    Final Airway Details  Final airway type: endotracheal airway      Successful airway: ETT     Successful intubation technique: direct laryngoscopy  Facilitating devices/methods: intubating stylet and anterior pressure/BURP  Endotracheal tube insertion site: oral  Blade: Pal  Blade size: #3  ETT size (mm): 7.5  Cormack-Lehane Classification: grade IIa - partial view of glottis  Placement verified by: chest auscultation and capnometry   Measured from: lips  ETT to lips (cm): 21  Number of attempts at approach: 1    Additional Comments  SMALL MOUTH ANTERIOR AIRWAY VOCAL CORDS SEEN WITH CRICOID AND HEAD LIFT

## 2024-08-06 NOTE — DISCHARGE SUMMARY
MD Ashley Cardoso, MPAS, PAMattieC, ATC  Adult Reconstruction and Joint Replacement Surgery  Phone: [phone]     Fax:[phone]             Discharge Summary    Discharge Diagnosis  Revision Left Total Hip Arthroplasty    Issues Requiring Follow-Up  Home care services to start within 48 hours. Outpatient PT to start 2 weeks  S/P total Joint for Knees only. Hips optional.    Test Results Pending At Discharge  Pending Labs       No current pending labs.          Hospital Course  Patient underwent Revision Left Total Hip Arthroplasty on 8/6/24 without complications. The patient was then taken to the PACU in stable condition. Patient was then transferred to the or.  Pain was appropriately controlled. Diet was advanced as tolerated. Patient progressed adequately through their recovery during hospital stay including PT/ OT and were recommended for discharge. Patient was then discharged on  to home in stable condition. Patient had uneventful hospital course. Patient was instructed on the use of pain medications as needed for pain. The signs and symptoms of infection were discussed and the patient was given our number to call should they have any questions or concerns following discharge.    Based on my clinical judgment, the patient was provided with a 7-day prescription for opioid medication at 30 MED, indicated for treatment of acute pain in the setting of recent Total Joint Arthroplasty. OARRS report was run and has demonstrated an appropriate time course.  The patient has been provided with counseling pertaining to safe use of opioid medication.    Patient may use operative extremity 25% weight bearing for 8 weeks with use of walker for assistance with ambulation .  Mepilex dressing to be removed POD # 7 by home care and incision left open to air  OAC for DVT prophylaxis started on POD #1 and to be taken for 30 days    Patient is to follow-up in 6 weeks at scheduled post-op visit.      Face-to Face after surgery progress note  Pertinent Physical Exam At Time of Discharge  Review of Systems   Constitutional: Negative.  Negative for activity change, chills, fatigue and fever.   HENT: Negative.     Eyes: Negative.    Respiratory: Negative.  Negative for cough, chest tightness, shortness of breath and wheezing.    Cardiovascular: Negative.  Negative for palpitations.   Gastrointestinal:  Negative for abdominal pain, blood in stool, nausea and vomiting.   Endocrine: Negative.  Negative for cold intolerance and polyuria.   Genitourinary: Negative.  Negative for difficulty urinating, dysuria, frequency, hematuria and urgency.   Musculoskeletal:  Positive for gait problem and joint swelling. Negative for arthralgias and back pain.   Skin: Negative.  Negative for color change, pallor, rash and wound.   Allergic/Immunologic: Negative.  Negative for environmental allergies.   Neurological:  Negative for dizziness, weakness and light-headedness.   Hematological: Negative.    Psychiatric/Behavioral:  Negative for agitation, confusion and suicidal ideas. The patient is not nervous/anxious.    All other systems reviewed and are negative    Physical Exam  side: left hip  Vitals and nursing note reviewed. VSS, Afebrile  Constitutional:       Appearance: Normal appearance, awake and alert.  HENT:      Head: Normocephalic and atraumatic.       Pupils: Pupils are equal, round, and reactive to light.   Cardiovascular:      Rate and Rhythm: Normal rate and regular rhythm.   Pulmonary:      Effort: Pulmonary effort is normal.     Abdominal:         Palpations: Abdomen is soft.   Musculoskeletal:   Sensation intact bilaterally, sural/saph/sp/tibal n.  Motor intact flexion/extension/DF/PF/EHL/FHL bilaterally. Palpable symmetric DP/PT pulse bilaterally. Spinal wearing off.    Skin:      Bulky Dressing intact to the surgical extremity. No signs of gross bloody or purulent drainage.     General: Skin is warm and dry.       Capillary Refill: Capillary refill takes less than 2 seconds.   Neurological:      General: No focal deficit present.      Mental Status: She is alert and oriented to person, place, and time. Mental status is at baseline.   Psychiatric:         Mood and Affect: Mood normal.        Home Medications  Scheduled medications    Current Facility-Administered Medications:     ceFAZolin (Ancef) 2 g in dextrose (iso)  mL, 2 g, intravenous, Once, Ashley Pak PA-C    lactated Ringer's infusion, 75 mL/hr, intravenous, Continuous, Ashley Pak PA-C    lactated Ringer's infusion, 100 mL/hr, intravenous, Continuous, Anjum Tovar MD, Last Rate: 100 mL/hr at 08/06/24 0637, 100 mL/hr at 08/06/24 0637    scopolamine (Transderm-Scop) patch 1 patch, 1 patch, transdermal, q72h, Ashley Pak PA-C, 1 patch at 08/06/24 0640     PRN medications      Discharge medications     Your medication list        START taking these medications        Instructions Last Dose Given Next Dose Due   acetaminophen 500 mg tablet  Commonly known as: Tylenol Extra Strength      Take 2 tablets (1,000 mg) by mouth every 6 hours if needed for mild pain (1 - 3).       apixaban 2.5 mg tablet  Commonly known as: Eliquis      Take 1 tablet (2.5 mg) by mouth 2 times a day.       docusate sodium 100 mg capsule  Commonly known as: Colace      Take 1 capsule (100 mg) by mouth 2 times a day.       oxyCODONE 5 mg immediate release tablet  Commonly known as: Roxicodone      Take 1 tablet (5 mg) by mouth every 6 hours if needed for severe pain (7 - 10) for up to 7 days.       pantoprazole 40 mg EC tablet  Commonly known as: ProtoNix      Take 1 tablet (40 mg) by mouth once daily. Do not crush, chew, or split.       polyethylene glycol 17 gram packet  Commonly known as: Glycolax, Miralax      Take 17 g by mouth once daily. Mix 1 cap (17g) into 8 ounces of fluid.       traMADol 50 mg tablet  Commonly known as: Ultram      Take 1 tablet (50 mg) by mouth every  6 hours if needed for severe pain (7 - 10) for up to 7 days.              ASK your doctor about these medications        Instructions Last Dose Given Next Dose Due   amLODIPine 5 mg tablet  Commonly known as: Norvasc           calcium carbonate  mg (750 mg) chewable tablet  Commonly known as: Tums Extra Strength           chlorhexidine 0.12 % solution  Commonly known as: Peridex      Swish for 30 seconds and spit 15mL of solution the night before and morning of surgery       famotidine 20 mg tablet  Commonly known as: Pepcid           GLUCOSAMINE-CHOND-MSM COMPLEX ORAL           levothyroxine 112 mcg tablet  Commonly known as: Synthroid, Levoxyl           losartan-hydrochlorothiazide 100-12.5 mg tablet  Commonly known as: Hyzaar           naproxen 250 mg tablet  Commonly known as: Naprosyn           oxybutynin XL 5 mg 24 hr tablet  Commonly known as: Ditropan-XL                     Where to Get Your Medications        These medications were sent to [facility]  [address], [address], [place] [id number]      Hours: 8 AM to 6 PM Mon-Fri, 9 AM to 1 PM Saturday Phone: [phone]   acetaminophen 500 mg tablet  apixaban 2.5 mg tablet  docusate sodium 100 mg capsule  oxyCODONE 5 mg immediate release tablet  pantoprazole 40 mg EC tablet  polyethylene glycol 17 gram packet  traMADol 50 mg tablet         You have not been prescribed any medications.     Outpatient Follow-Up  Patient to follow-up with /Ashley Pak PA-C.  Thank you for trusting us with your care. You should be scheduled for a follow-up post-surgical visit in 6 weeks.    Special Instructions  25% weightbearing for 8 weeks.    Please read discharge instructions provided by your surgeon before calling with questions as this will delay care.    Medication refills-Oxycodone and Tramadol will be refilled every 7 days per state law. Request refills through [facility] at the institution in which you had surgery or MyChart.  All medication requests may take up to 72 hours to refill and refills after Friday 1pm will be refilled on the next business day.      No future appointments.    KOMAL Vasquez, PA-C ATC  Orthopedic Physician [employer]ant  Adult Reconstruction and Total Joint Replacement  General Orthopedics  Department of Orthopaedic Surgery  [place] [id number]  Clean Te[employer]aging preferred

## 2024-08-06 NOTE — DISCHARGE INSTRUCTIONS
MD Ashley Cardoso MPAS, JONES, ATC  Adult Reconstruction and Joint Replacement Surgery  Phone: [phone]     Fax: [phone]        DISCHARGE INSTRUCTIONS      PLEASE READ CAREFULLY BEFORE CONTACTING YOUR PROVIDER.    WE WORK COLLABORATIVELY AS A TEAM. CALLING MULTIPLE STAFF MEMBERS REGARDING THE SAME ISSUE WILL DELAY YOUR CARE.    [employer] IS THE PREFERRED COMMUNICATION FOR ALL TEAM MEMBERS.    POSTOPERATIVE INSTRUCTIONS: TOTAL HIP & TOTAL KNEE ARTHROPLASTY    JOINT CARE TEAM  Please use the information below to contact your care team following surgery.  If you are leaving a message or using the [employer] Chart portal, please include your full name, date of birth and date of surgery so that we can correctly identify you.  Your call will be returned within 1-2 business days, please do not leave multiple messages with other staff regarding a single issue while you are awaiting a return call.     Who to call Contact Information Matters needing handled   Ashley Pak PA-C  Physician Assistant [facility]   Medical questions/concerns       Darian Chavira-    [email or web address]           [phone]  opt. 2    [phone] fax  [phone]         Scheduling office Visits  Medical questions/concerns  Leave of Absence or other paperwork  Any concerns more than 6 weeks from surgery - an appointment will need to be made   Joselyn Lainez MBA, BSN, RN-BC  Ortho Nurse Navigator [facility]    Neema De La Torre RN, BSN  Ortho Nurse Navigator [facility]        __________________________________    Chapo Hernández RN, BSN  Ortho Coordinator [facility] GUZMANN-BC  Ortho Nurse Navigator [facility]       [phone] Please call staff at the institution in which you had surgery for prescription refills        Prescription Refills  Nursing, medical question related questions or concerns within 6 weeks of surgery   Orders  for Outpatient Physical Therapy             MEDICATION REFILLS - MyChart or Nurse Navigator (Above) at the institution in which you had surgery. [facility] or [facility].    -You will NOT receive a call indicating that your prescription has been filled.  Please contact your pharmacy with any questions.    Medication refills will be filled Monday-Friday 7am to 1pm ONLY. Please call the nurse navigator office or send a [employer] message for a refill request.  Any requests received outside of this timeframe will be handled on the next business day.  Please do not call multiple times or call other members of the care team for medication needs, this will cause the refill to take longer.    Per State and Institutional policy, pain medications can only be refilled every 7 days for up to six weeks following surgery.    My Chart Portal: If you are using the My Chart portal and are requesting a medication refill, please list what type of surgery you had and left or right side, medication that needs refilled, and pharmacy you would like your medication sent.     WEIGHT BEARING- 25% weightbearing for 8 weeks    ACTIVITY-As Tolerated    DRIVING & TRAVEL AFTER SURGERY   Patients should anticipate waiting at least 4-6 weeks before traveling long distances after surgery.  You will need to stop to walk around ever 1 hour during your travel to help with blood clot prevention.    Patients may not drive until cleared by the joint nurse or the office and you are off of all narcotics.    DENTAL PROCEDURES & CLEANINGS  You must wait a minimum of 3 months for elective dental appointments after a total joint replacement, including routine cleanings or dental work including bridges, crowns, extractions, etc.. Unless, it is an emergency. You will need a prophylactic antibiotic lifelong prior to any dental visit, cleaning or procedure. Your surgeon's office or your dentist may provide a prescription antibiotic. Antibiotics are a lifelong need  before dental appointments.      You do not need antibiotics for endoscopic procedures such as colonoscopy or EGD, dermatologic biopsies or eye surgeries.    WOUND CARE  If you experience continued drainage or bleeding, you may cover with abdominal/Maxi pads (purchase at local drug store).  Knee replacements should wrap with an ace wrap.  You may shower with waterproof dressing on. Your surgical bandage will be removed by your home therapist 1 week after surgery. If you have staples intact, home care will remove in 2 weeks. If you have sutures intact, you will need to return to the office in 2 weeks for suture removal. Once the dressing is removed by home care, you may continue to shower. Let soap and water wash over the wound. DO NOT SCRUB.  Steri-strips under the bandage will remain in place until they fall off on their own.  If they are loose, you may gently remove.  If they have not fallen off in two weeks, gently peel them off. Do not remove if pulling causes resistance against the incision.  You will see suture tails sticking out of the ends of the incision.  DO NOT CUT THEM.  They will fall off when the sutures dissolve.  If they are bothersome, cover with a band aid.  Do not soak in a bath tub, hot tub, pool or lake until you are 8 weeks out from surgery.  Do not apply lotions, creams or ointments until you are 6 weeks out from surgery,    PAIN, SWELLING, BRUISING & CLICKING  Pain and swelling are a natural part of your recovery which is considered normal for up to a year after surgery.  Symptoms may be treated with movement, ice, compression stockings, elevating your leg, and by following the pain medication regimen as prescribed.  Bruising is normal for several weeks after surgery. You may also have leg swelling and pain in your shin.  You may ice areas that are tender to help with discomfort.  You are required to wear the provided compression stockings, every day, for 4 weeks following surgery.  Remove  the stockings at night and place them back on in the morning.  Pain and swelling may temporarily increase with an increase in activity or exercise.  Use ice after activity.  Audible clicking with movement or exercises is considered normal following joint replacement.  If this persists at 6 months or 1 year, please notify your surgeon.  You may also feel decreased sensation or numbness near the incision site.  This is normal and sensation may or may not return.    PERSONAL HYGIENE  You may shower upon discharging from the hospital.  Soap and water is permitted to run over the surgical dressing, steri-strips and incision.  Do not scrub directly over these items.  DO NOT soak your incision in a bath, hot tub, pool or pond/lake for a minimum of 8 weeks following your surgery.  DO NOT use lotions, creams, ointments on your wound for a minimum of 6 weeks following your surgery. At that time you may use vitamin E to assist with softening of your incision.      RESTARTING HOME ROUTINE - DIET & MEDICATIONS  Post-operative constipation can result due to a combination of inactivity, anesthesia and pain medication. To help prevent this, you should increase your water and fiber intake. Physical activity such as walking will also help stimulate the bowels.   You may resume your normal diet when you discharge home.    To avoid constipation, choose foods that help promote good bowel habits, such as foods high in fiber.  You may restart your home medications the following day after your surgery UNLESS you have been given alternate instructions.  Follow the instructions given to you on your hospital discharge instructions for more information regarding your home medications.  IF YOU EXPERIENCE NAUSEA OR DIARRHEA, FOLLOW THE B.R.A.T. DIET UNTIL SYMPTOMS RESOLVE.  If you are experiencing vomiting that lasts more than 24 hours, please contact your joint nurse.      IN-HOME PHYSICAL THERAPY & OUTPATIENT PHYSICAL THERAPY  In-home physical  therapy will start 1-2 days after you get home from the hospital.    The home care agency will call within the first 24-48 hours to set up their first visit.  Please do not call your care team to inquire during this timeframe.  Continue the exercises you were given in the hospital until you have been seen by in-home therapy.  Make sure to provide a phone number with the ability for the home care staff to leave a message if you do not answer your phone.    Outpatient physical therapy following knee replacement surgery should begin 2-3 weeks after surgery.  You will be given physical therapy order prior to discharge from the hospital. You should call to schedule this appointment ASAP if not already scheduled before surgery.  Waiting until you are ready for outpatient physical therapy will cause a delay in your care.  You may choose any outpatient physical therapy location.      EMERGENCIES - WHEN TO CONTACT THE SURGEON'S OFFICE IMMEDIATELY  Fever >101 with chills that has been present for at least 48 hours.   Excessive bleeding from incision that will not slow down. A small amount of drainage is normal and expected.  Once pressure is applied and the area is covered, do not continue to check the area regularly.  This will remove pressure and bleeding will continue.  Leave in place for 4-6 hours.  Signs of infection of incision-excessive drainage that is soaking through your dressing (especially if it is pus-like), redness that is spreading out from the edges of your incision, or increased warmth around the area.  Excruciating pain for which the pain medication, taken as instructed, is not helping.  Severe calf pain.  Go directly to the emergency room or call 911, if you are experiencing chest pain or difficulty breathing.    After Hour and Weekend Emergency Answering Service [phone]    ICE & COLD THERAPY INSTRUCTIONS    To assist with pain control and post-op swelling, you should be using ice regularly throughout  recovery, especially for the first 6 weeks, regardless of the cold therapy method you use.      Always make sure there is a layer of protection between the cold pad and your skin.    If you are using ICE PACKS or GEL PACKS, you will need to alternate 20 minutes on, 20 minutes off twice per hour.    If you are using an ICE MACHINE, please follow the provided ice machine instructions.  These devices differ from ice or ice packs whereas the mechanism circulates water through tubing and a pad to provide longer periods of cold therapy to the desired site.  You can use your cold devices around the clock for optimal comfort.  We recommend using cold therapy after working with therapy or completing exercises on your own.  There is no set schedule in which you must follow while using cold therapy.  Below are a few points to remember when using a cold therapy device:    You do not need to need to use the 20 on, 20 off method.  Detach the pad from the cooler and ambulate at least once every hour.  You can check your skin under the pad at this time.  You may wear the cold therapy device during periods of sleep including overnight.  If you wake up during the night, you can check the skin at this time.  You do not need to wake up specifically to perform skin checks.  Empty the cooler and pad when device is not in use.  Follow 's instructions for cleaning your cold therapy device.    DISCHARGE MEDICATIONS - Please reference the sample schedule on the reverse side for instructions on how to best schedule medications.    PAIN MEDICATION    ___X_ Tramadol / Oxycodone  Tramadol and Oxycodone have been prescribed for post-operative pain control.    These medications will only be refilled ONCE every 7 days for a period of up to 6 weeks following surgery.  After 6 weeks, you will transition to acetaminophen and over -the- counter anti-inflammatories such as Ibuprofen, Advil or Aleve in conjunction with ICE/COLD THERAPY.    Side effects may be constipation and nausea, vomiting, sleepiness, dizziness, lightheadedness, headache, blurred vision, dry mouth sweating, itching (if you have itching, over-the -counter Benadryl can be used as needed).  You may NOT operate a motor vehicle while taking these medications or have been cleared by your care team.     ___X_ Acetaminophen (Tylenol)  Acetaminophen has been prescribed as an adjunct for pain control. Take two 500 mg tablets every 6 hours for 4 weeks. You will not receive a refill on this medication.  Do not exceed 4000mg of acetaminophen within a 24 hour period.  Side effects may include nausea, heartburn, drowsiness, and headache.    ___ Meloxicam (Mobic)-Meloxicam has been prescribed as an adjunct anti-inflammatory to assist in pain control.    Take one 15mg tablet once daily for 4 weeks.  You will not receive refills on this medication.   Side effects may include nausea.  May not be prescribed if you are on a more potent blood thinner than aspirin or have chronic kidney disease.    BLOOD THINNER    ___X_ Blood Thinner   A blood thinner has been prescribed to prevent blood clots in your leg or lungs. Take as prescribed on the bottle for 4 weeks. You will not receive a refill on this medication.    ANTI NAUSEA    ___X_ Proton Pump Inhibitor (PPI)-Stomach Acid Reduction Medication  If you are already on a PPI, you will continue your regular medication. If you are not, you will be prescribed Pantoprazole to help with nausea and protect your stomach while taking pain medication.  You will not receive a refill on this medication.    STOOL SOFTENERS    ___X_ Colace (Docusate Sodium) & Miralax (polyethylene glycol)  Take both medications to help with constipation while using the Oxycodone and Tramadol for pain control.  You will not receive a refill on this medication.    Continued Constipation  If you continue to be constipated despite daily use of Miralax and Colace, you try an  over-the-counter Dulcolax Suppository and use per instructions on the package.       SPECIAL INSTRUCTIONS   Partial 25% WB for 8 weeks    You will not receive refills on the following medications.   Acetaminophen (Tylenol  Miralax  Colace  Proton Pump Inhibitor (PPI)  Blood Thinner    Pain Medication Refills - Ortho Nurse Navigator or MyChart- Monday through Friday 7am-1pm    FOLLOW-UP- You should have an appointment with either Dr. Muniz or DAMIÁN Wells in 6 weeks.         SAMPLE              The times below are an example of how to organize medications to optimize pain control  Your actual medication schedule may vary based on your last dose taken IN THE HOSPITAL      Time 3:00 am 6:00 am 9:00 am 12:00 pm 3:00 pm 6:00 pm 9:00 pm 12:00 am   Medications Tramadol Tylenol  Oxycodone  Miralax   Blood Thinner  Colace  Pantoprazole or other PPI  Tramadol   Tylenol  Oxycodone Tramadol Tylenol  Oxycodone  Miralax Blood Thinner  Colace  Tramadol   Tylenol  Oxycodone            You may begin to wean off the pain medication as your pain remains controlled with increased activity.  The schedules provided are meant to serve as an example.  You may wean off based on your pain control.  Please note that pain medications are not filled beyond 6 weeks after surgery.              The times below are an example of how to WEAN OFF medications WHILE CONTINUING TO OPTIMIZE PAIN CONTROL.  Your actual medication schedule may vary based on your last dose taken.    Time 12:00am 4:00am 8:00am 12:00pm 4:00pm 8:00pm   Med Tramadol Oxycodone   Tramadol Oxycodone Tramadol Oxycodone     Time 12:00am 6:00am 12:00pm 6:00pm   Med Tramadol Oxycodone   Tramadol Oxycodone     Time 12:00am 8:00am 4:00pm   Med Tramadol Oxycodone   Tramadol     Time 12:00am 12:00pm   Med Tramadol Tramadol         TOTAL KNEE REPLACEMENT PATIENTS SHOULD TRANSITION TO OUTPATIENT PHYSICAL THERAPY NO MORE THAN 3 WEEKS FOLLOWING SURGERY.  PLEASE SEE THE LIST OF [facility]  FACILITIES BELOW.  CALL TO SCHEDULE YOUR FIRST APPOINTMENT BEFORE YOU HAVE YOUR SURGERY.

## 2024-08-06 NOTE — SIGNIFICANT EVENT
58 female status post revision left total hip arthroplasty with Dr. Muniz on 8/6/2024.    Plan:  -25% weightbearing left lower extremity x 8 weeks  -Multimodal pain control.  -Ancef for 24 hours and postoperative.  -Postop x-rays of the pelvis were reviewed.  -SCDs, DESTINY vallee.  -Eliquis 2.5 twice daily starting postop day 1 for DVT prophylaxis to be continued for 4 weeks.  -PT and OT to evaluate and treat.  -Surgical dressing covering incision on the left hip to remain in place x 1 week.  -Encourage OB, frequent I-S use.  -Regular diet with bowel regimen.  -Follow-up with Dr. Muniz/Ashley Pak PA-C in 6 weeks.    Jigar Woodson, DO   PGY-IV  Orthopaedic Surgery   Pager: [phone]  Epic Chat Preferred     While admitted, this patient will be followed by the adult reconstruction team. Please reach out to orthopaedic on-call ROXANA first with any questions or concerns. Please contact below residents with any questions (available via Epic Chat).     Joints Team: Oseas Rodriguez, PGY1; Dilcia Javier, PGY2; Jigar Woodson, PGY4    Sports Team: Park Montiel, PGY3; Jesenia Harrington, PGY5    Community Team: Ken Montgomery, PGY3;  See Plascencia, PGY5    Shoulder/Elbow Team:  Richar Bonilla, PGY4    Foot/Ankle Team: Luis Kaur, PGY3    On weekends and after 6PM:  [place] [facility]: Please reach out to the orthopaedic on-call resident ([phone])  At [facility]: Please reach out to the orthopaedic on-call ROXANA or resident (please refer to Qgenda)

## 2024-08-06 NOTE — OP NOTE
Left Hip Total Arthroplasty Revision (L) Operative Note     Date: 2024  OR Location: [facility] A OR    Name: Ashley Ngo, : 1965, Age: 58 y.o., MRN: [id number], Sex: female    Diagnosis  Pre-op Diagnosis      * Broken internal joint prosthesis, other site, initial encounter (CMS-HCC) [T84.018A] Post-op Diagnosis     * Broken internal joint prosthesis, other site, initial encounter (CMS-HCC) [T84.018A]     Procedures  Left Hip Total Arthroplasty Revision  05433 - [place] REVJ TOT HIP ARTHRP [facility] W/WO AGRFT/ALGRFT      Surgeons      * Everett Muniz - Primary    Resident/Fellow/Other Assistant:  Surgeons and Role:     * Ashley Pak PA-C - Assisting     * Jigar Woodson DO - Resident - Assisting    Procedure Summary  Anesthesia: General  ASA: II  Anesthesia Staff: Anesthesiologist: Anjum Tovar MD  CRNA: EILEEN Zamorano-CRNA; EILEEN Hsu-CRNA  Estimated Blood Loss: 100mL  Intra-op Medications:   Administrations occurring from 0830 to 1230 on 24:   Medication Name Total Dose   ropivacaine-epinephrine-clonidine-ketorolac 2.46-0.005- 0.0008-0.3mg/mL periarticular syringe 50 mL              Anesthesia Record               Intraprocedure I/O Totals          Intake    Tranexamic Acid 0.00 mL    The total shown is the total volume documented since Anesthesia Start was filed.    Total Intake 0 mL          Specimen:   ID Type Source Tests Collected by Time   1 : LEFT HIP CAPSULE Tissue HIP CAPSULE LEFT SURGICAL PATHOLOGY EXAM Everett Muniz MD 2024 0914   A : LEFT HIP SYNOVIAL FLUID Swab HIP ARTHROPLASTY LEFT FUNGAL CULTURE/SMEAR, TISSUE/WOUND CULTURE/SMEAR Everett Muniz MD 2024 0911        Staff:   Circulator: Zuri Barragan Person: Teresa Madrid Circulator: Raymond         Drains and/or Catheters: * None in log *    Tourniquet Times:         Implants:  Implants       Type Name Action Serial No.      Graft BONE CHIPS,  CANCELL 30CC 1.7-10MM -  [id number] - [id number] Implanted [id number]     Graft BONE CHIPS,  CANCELL 30CC 1.7-10MM - [id number] - [id number] Implanted [id number]     Graft BONE, CHIP, CANCELLOUS, 1.7-10 MM, 15 CC - [id number] - [id number] Implanted [id number]     Joint Hip SHELL, OSSEOTI, MULTIHOLE, 54 MM F - SNA - [id number] Implanted NA     Joint Hip SCREW, SELF-TAPPING, TRILOGY, 6.5 X 25 MM, STAINLESS STEEL - SNA - [id number] Implanted NA     Joint Hip SCREW, SELF-TAPPING, TRILOGY, 6.5 X 20 MM, STAINLESS STEEL - SNA - [id number] Implanted NA     Joint Hip SCREW, SELF-TAPPING, TRILOGY, 6.5 X 30 MM, STAINLESS STEEL - SNA - [id number] Implanted NA     Joint Hip SCREW, SELF-TAPPING, TRILOGY, 6.5 X 20 MM, STAINLESS STEEL - SNA - [id number] Implanted NA     Joint Hip SCREW, SELF-TAPPING, TRILOGY, 6.5 X 20 MM, STAINLESS STEEL - SNA - [id number] Implanted NA     Joint Hip SCREW, SELF-TAPPING, TRILOGY, 6.5 X 25 MM, STAINLESS STEEL - SNA - [id number] Implanted NA     Joint Hip SCREW, SELF-TAPPING, TRILOGY, 6.5 X 20 MM, STAINLESS STEEL - SNA - [id number] Implanted NA     Joint Hip BANTAM FEMORAL HEAD Implanted NA     Joint Hip LINER, COCR, G7 DUAL MOBILITY, SZ F, 44MM - SNA - [id number] Implanted NA     Joint Hip DUAL MOBILITY VIVACIT-E POLYETHELENE BEARING 28MM 40MM SIZE F Implanted NA              Findings: failed left MISA    Indications: Ashley Ngo is an 58 y.o. female who is having surgery for Broken internal joint prosthesis, other site, initial encounter (CMS-[facility]) [T84.018A].     The patient was seen in the preoperative area. The risks, benefits, complications, treatment options, non-operative alternatives, expected recovery and outcomes were discussed with the patient. The possibilities of reaction to medication, pulmonary aspiration, injury to surrounding structures, bleeding, recurrent infection, the need for additional procedures, failure to diagnose a condition, and creating a complication requiring  transfusion or operation were discussed with the patient. The patient concurred with the proposed plan, giving informed consent.  The site of surgery was properly noted/marked if necessary per policy. The patient has been actively warmed in preoperative area. Preoperative antibiotics have been ordered and given within 1 hours of incision. Venous thrombosis prophylaxis have been ordered including bilateral sequential compression devices    Procedure Details: revision left MISA  Complications:  None; patient tolerated the procedure well.    Disposition: PACU - hemodynamically stable.  Condition: stable     PREOPERATIVE DIAGNOSIS:  Failed left MISA     POSTOPERATIVE DIAGNOSIS:  Failed left MISA     OPERATION/PROCEDURE:  Revision left MISA     SURGEON:  Everett Muniz MD     ASSISTANT(S):  CHUCK Wells    The patient's surgery was assisted by DAMIÁN Wells, due to lack of availability of a qualified resident to assist with the surgery.  Ashley Pak was present for the essential parts of the procedure.  She acted as first assistant and assisted with preparing the leg, draping the leg, exposing the knee, retracting and manipulating the leg during surgery, facilitating safe performance of the procedure, and closure of the wound. The resident was only present for 1/3 of the case.     ANESTHESIA:  Spinal.     ESTIMATED BLOOD LOSS AND INTRAVENOUS FLUIDS:  Please see Anesthesia record.     LOCATION:  [facility]    The patient is a pleasant 52-year-old female with a history of JRA and remote   history of a primary left total hip arthroplasty.  She had a [place] AML stem   in place and a cup that had gone on to loosen and fail.  Preoperative x-rays   revealed osteolysis and aseptic loosening of her cup.  She wished to undergo   revision right total hip arthroplasty.  We discussed the risks, benefits, and   alternatives to surgery including, but not limited to, infection, damage to   vessel or nerve, bleeding, soft tissue  pain, DVT, PE, problems with   anesthesia, leg length discrepancy, dislocation, need for further surgery,   etc.  Consent was obtained.  She was taken to the operating room in order to   undergo her procedure     OPERATIVE REPORT:  The patient was transferred to the operating room table.  Time-out  was performed confirming patient name, medical record number,  surgical site, and adequate and appropriate imaging.  The patient  received appropriate IV antibiotics as well as tranexamic acid prior  to the start of the procedure.  Once we prepped and draped,  posterolateral approach to the hip was performed.  We used her previous incision.  Hemostasis obtained using which cautery.  Underlying gluteal fascia was identified and entered using electro cautery followed by Cortes scissors.  Tried but was placed.  We raised a singular posterior capsular flap.  She did have a small pseudotumor containing osteophytic debris.  A culture was sent.  We sent fresh frozen pathology which was negative for acute inflammation.  We then exposed the acetabulum.  The stem was solid.  We placed the anterior femoral retractor and retracted the stem superiorly anteriorly.  We debrided significant fibrinous material around the acetabulum.  The rim of the acetabulum exposed.  The acetabulum was removed easily.  We then reamed up to a 53 mm reamer.  We had good pinch fit with this.  It felt there was adequate coverage for a hemispherical shell.  We did bone graft multiple cavitary defects within the pelvis.  We then placed a 54 mm G7 acetabular osteophyte shell in appropriate anteversion inclination.  Sick screws were placed through the cup into the pelvis.  With excellent bite with 3 at mediocre with 3.  We then placed a trial liner for a dual mobility construct.  The only heads for the [facility] AML that are available are 28 mm with the maximum neck length +5.  It is a 10/12 taper.  We placed the trial and relocated the hip.  She was stable  throughout the range of motion.  We obtained an intraoperative x-ray.  Was happy to position the components and the stability of the hip.  The hip was again dislocated.  All trial components were removed.  The wound was thoroughly irrigated with Irrisept solution.  We then placed the real metal dual mobility liner and snapped it into place.  The dual mobility head construct was assembled on the back table.  She did have some taper corrosion which we debrided and cleaned.  There are not revision ceramic heads available for the trunnion.  The +5 28 mm had withdrawn with the trunnion and the hip was relocated.  Once again throughout of his motion she was stable.  We then thoroughly irrigated the wound.  The posterior capsule was reapproximated trochanter through drill holes using #5 Ethibond.  The subcu discussed interrupted 2-0 Vicryl.  The skin was closed with running 3-0 Monocryl followed by Dermabond and Steri-Strips.  Dry sterile dressing was placed.  Patient transferred back to the hospital bed without incidence of complication.  She to 25% weightbearing for 8 weeks and be on Eliquis for DVT prophylaxis.      Additional Details: 25% WB LLE x8 weeks, Eliquis    Attending Attestation: I was present and scrubbed for the key portions of the procedure.

## 2024-08-06 NOTE — NURSING NOTE
1139: Patient arrival to PACU, report received/care assumed.    1144: Family updated via Epic messenger    1209: Dilaudid 0.2 mg IV PRN given for pain per order    1218: Patient tolerating sips of ginger ale  1235: Patient tolerating dara crackers    1239: Oxycodone 5 mg PO PRN given for pain per order    1249: Dr. Muniz speaking with patient at bedside    1319: Handoff report given to room 706 RN    1330: Handoff report given to ANDERSON Watters

## 2024-08-06 NOTE — ANESTHESIA POSTPROCEDURE EVALUATION
Patient: Ashley Ngo    Procedure Summary       Date: 08/06/24 Room / Location: [facility] OR 11 / Virtual [facility] OR    Anesthesia Start: 0842 Anesthesia Stop: 1144    Procedure: Left Hip Total Arthroplasty Revision (Left: Hip) Diagnosis:       Broken internal joint prosthesis, other site, initial encounter (CMS-HCC)      (Broken internal joint prosthesis, other site, initial encounter (CMS-HCC) [T84.018A])    Surgeons: Everett Muniz MD Responsible Provider: Anjum Tovar MD    Anesthesia Type: general ASA Status: 2            Anesthesia Type: general    Vitals Value Taken Time   /73 08/06/24 1201   Temp 36.6 °C (97.9 °F) 08/06/24 1139   Pulse 80 08/06/24 1203   Resp 11 08/06/24 1203   SpO2 97 % 08/06/24 1203   Vitals shown include unfiled device data.    Anesthesia Post Evaluation    Patient location during evaluation: bedside  Patient participation: complete - patient cannot participate  Level of consciousness: awake  Pain score: 0  Pain management: adequate  Airway patency: patent  Cardiovascular status: acceptable  Respiratory status: acceptable  Hydration status: acceptable  Postoperative Nausea and Vomiting: none        No notable events documented.

## 2024-08-06 NOTE — PROGRESS NOTES
Physical Therapy    Physical Therapy Evaluation & Treatment    Patient Name: Ashley Ngo  MRN: [id number]  Today's Date: 8/6/2024   Time Calculation  Start Time: 1448  Stop Time: 1530  Time Calculation (min): 42 min    Assessment/Plan   PT Assessment  PT Assessment Results: Decreased strength, Decreased endurance, Impaired balance, Decreased mobility, Orthopedic restrictions  Rehab Prognosis: Good  Barriers to Discharge: Pending stair training with steps to access home  Evaluation/Treatment Tolerance: Patient tolerated treatment well, Patient limited by fatigue  Medical Staff Made Aware: Yes  Strengths: Ability to acquire knowledge, Premorbid level of function, Support and attitude of living partners  Barriers to Participation:  (None identified)  End of Session Communication: Bedside nurse  Assessment Comment: Pt presents today with decreased BLE strength, balance and endurance. Pt would benefit from continued PT to address the above factors, progress gait training and attempt stair training to bring the pt closer to the PLOF.  End of Session Patient Position: Up in chair, Alarm on   IP OR SWING BED PT PLAN  Inpatient or Swing Bed: Inpatient  PT Plan  Treatment/Interventions: Bed mobility, Transfer training, Gait training, Stair training, Balance training, Strengthening, Endurance training, Therapeutic exercise, Therapeutic activity, Home exercise program, Positioning, Postural re-education  PT Plan: Ongoing PT  PT Frequency: BID  PT Discharge Recommendations: Low intensity level of continued care  Equipment Recommended upon Discharge: Wheeled walker  PT Recommended Transfer Status: Assist x1, Assistive device  PT - OK to Discharge: Yes (Per PT POC)    Subjective     General Visit Information:  General  Reason for Referral: 57 y/o F s/p L MISA on 8/6/24  Referred By: KAREN Muniz  Past Medical History Relevant to Rehab:   Past Medical History:   Diagnosis Date    Broken internal joint prosthesis (CMS-HCC)      Bursitis     hips    GERD (gastroesophageal reflux disease)     Hip pain     HLD (hyperlipidemia)     patient denies    HTN (hypertension)     Hypothyroidism     JRA (juvenile rheumatoid arthritis) (Multi)     OA (osteoarthritis)     PONV (postoperative nausea and vomiting)      Family/Caregiver Present: Yes  Caregiver Feedback:  present  Prior to Session Communication: Bedside nurse  Patient Position Received: Bed, 3 rail up, Alarm on  Preferred Learning Style: auditory, kinesthetic, verbal, visual  General Comment: Pt supine in bed upon PT arrival. Cleared to participate with RN, and agreeable to PT evalluation.  Home Living:  Home Living  Type of Home: House  Lives With: Spouse  Home Adaptive Equipment: Walker rolling or standard, Reacher, Sock aid, Long-handled shoehorn (Standard walker)  Home Layout: One level  Home Access: Stairs to enter with rails  Entrance Stairs-Rails: Left  Entrance Stairs-Number of Steps: 2  Bathroom Shower/Tub: Walk-in shower  Bathroom Toilet: Handicapped height  Bathroom Equipment: Shower chair without back  Prior Level of Function:  Prior Function Per Pt/Caregiver Report  Level of [place]: Independent with ADLs and functional transfers, Independent with homemaking with ambulation  Receives Help From: Family  ADL Assistance: Independent  Homemaking Assistance: Independent  Ambulatory Assistance: Independent (No AD)  Vocational:  (Pt works in customer service)  Leisure: Pt enjoys shopping and trying new restuaraunts  Prior Function Comments: +Driving. Pt reports recent fall in the closet after tripping on shoes  Precautions:  Precautions  LE Weight Bearing Status: Left Partial Weight Bearing (25% weig)  Medical Precautions: Fall precautions  Post-Surgical Precautions: Left hip precautions (Posterior precautions)    Objective   Pain:  Pain Assessment  Pain Assessment: 0-10  0-10 (Numeric) Pain Score: 2  Pain Type: Surgical pain  Pain Location: Hip  Pain Orientation:  Left  Pain Interventions: Ambulation/increased activity, Repositioned  Response to Interventions: Unchanged  Cognition:  Cognition  Orientation Level: Oriented X4  Insight: Within function limits  Impulsive: Within functional limits    General Assessments:  Activity Tolerance  Endurance: Tolerates 30+ min exercise without fatigue    Sensation  Light Touch: No apparent deficits    Coordination  Movements are Fluid and Coordinated: Yes    Postural Control  Head Control: Forward head posture with gaze directed at the ground. Pt able to correct with VC    Static Sitting Balance  Static Sitting-Balance Support: Bilateral upper extremity supported, Feet supported  Static Sitting-Level of Assistance: Distant supervision  Dynamic Sitting Balance  Dynamic Sitting-Balance Support: Bilateral upper extremity supported  Dynamic Sitting-Comments: CGA during EOb scooting for  foot placement the floor and improved stability    Static Standing Balance  Static Standing-Balance Support: Bilateral upper extremity supported  Static Standing-Level of Assistance: Contact guard  Static Standing-Comment/Number of Minutes: With FWW support  Dynamic Standing Balance  Dynamic Standing-Balance Support: Bilateral upper extremity supported  Dynamic Standing-Comments: CGA with FWW support  Functional Assessments:  Bed Mobility  Bed Mobility: Yes  Bed Mobility 1  Bed Mobility 1: Supine to sitting  Level of Assistance 1: Close supervision  Bed Mobility Comments 1: HOb elevated. Pt able to progress RLE off the EOB with self assist to the LLE for progression. VC to maintain posterior hip precautions    Transfers  Transfer: Yes  Transfer 1  Transfer From 1: Bed to  Transfer to 1: Stand  Technique 1: Sit to stand  Transfer Device 1: Walker, Gait belt  Transfer Level of Assistance 1: Minimum assistance, Minimal verbal cues  Trials/Comments 1: VC for BUE push from the bed instead of pulling to stand from the walker. VC for 25% weight bearing on the  LLE.  Transfers 2  Transfer From 2: Stand to  Transfer to 2: Chair with arms  Technique 2: Stand to sit  Transfer Device 2: Walker, Gait belt  Transfer Level of Assistance 2: Contact guard, Minimal verbal cues  Trials/Comments 2: VC for BLE positioning against the bed before attempting to sit. VC for BUE reach for the bed when sitting. Fair control noted on descent to the bed.    Stairs  Stairs: No  Extremity/Trunk Assessments:  RLE   RLE : Exceptions to WFL  Strength RLE  RLE Overall Strength: Greater than or equal to 3/5 as evidenced by functional mobility  LLE   LLE : Exceptions to WFL  Strength LLE  L Hip Flexion:  (At least 2-/5)  L Knee Extension: 2/5  L Ankle Dorsiflexion: 3+/5  L Ankle Plantar Flexion: 3+/5  Treatments:  Therapeutic Exercise  Therapeutic Exercise Performed: Yes  Therapeutic Exercise Activity 1: Pt performed 1 x10 of isabel following exercises on the LLE: AP, HS, QS, GS, SAQ, supine hip abduction, LAQ. Assist required for complete ROM during SAQ and LAQ exercises. Pt provided with VC and visual demonstration to maximize understanding and technique when performing exercises.    Ambulation/Gait Training  Ambulation/Gait Training Performed: Yes  Ambulation/Gait Training 1  Surface 1: Level tile  Device 1: Rolling walker  Gait Support Devices: Gait belt  Assistance 1: Contact guard, Minimal verbal cues  Comments/Distance (ft) 1: About 70 ft. VC for step to pattern with increased BUE weight bearing through the FWW to maintain 25% weight bearing on the LLE. Pt reports sensation of RLE weakness/fatigue during trial resulting in 2 standing rest breaks. Pt denies dizziness during trial.  Outcome Measures:  [facility] Basic Mobility  Turning from your back to your side while in a flat bed without using bedrails: A little  Moving from lying on your back to sitting on the side of a flat bed without using bedrails: A little  Moving to and from bed to chair (including a wheelchair): A little  Standing up from a  chair using your arms (e.g. wheelchair or bedside chair): A little  To walk in hospital room: A little  Climbing 3-5 steps with railing: A lot  Basic Mobility - Total Score: 17    Encounter Problems       Encounter Problems (Active)       Balance       Goal 1 (Progressing)       Start:  08/06/24    Expected End:  08/20/24       Pt performs all sitting balance IND and standing balance mod IND using FWW            Mobility       STG - Patient will navigate 4-6 steps with L HR support and CGA (Not Progressing)       Start:  08/06/24    Expected End:  08/20/24            STG - Patient will ambulate (Progressing)       Start:  08/06/24    Expected End:  08/20/24       150 ft with close supervision using FWW            PT Problem       PT Goal 1 (Progressing)       Start:  08/06/24    Expected End:  08/20/24       Pt demonstrates IND in performing 2 sets of 12 reps of prescribed LE HEP per MISA protocol            PT Transfers       STG - Patient to transfer to and from sit to supine (Progressing)       Start:  08/06/24    Expected End:  08/20/24       Mod IND         STG - Patient will transfer sit to and from stand (Progressing)       Start:  08/06/24    Expected End:  08/20/24       Mod IND using FWW              Education Documentation  Handouts, taught by Himanshu Bennett PT at 8/6/2024  4:54 PM.  Learner: Patient  Readiness: Acceptance  Method: Explanation, Demonstration, Handout  Response: Verbalizes Understanding    Precautions, taught by Himanshu Bennett PT at 8/6/2024  4:54 PM.  Learner: Patient  Readiness: Acceptance  Method: Explanation, Demonstration, Handout  Response: Verbalizes Understanding    Body Mechanics, taught by Himanshu Bennett PT at 8/6/2024  4:54 PM.  Learner: Patient  Readiness: Acceptance  Method: Explanation, Demonstration, Handout  Response: Verbalizes Understanding    Home Exercise Program, taught by Himanshu Bennett PT at 8/6/2024  4:54 PM.  Learner: Patient  Readiness:  Acceptance  Method: Explanation, Demonstration, Handout  Response: Verbalizes Understanding    Mobility Training, taught by Himanshu Bennett PT at 8/6/2024  4:54 PM.  Learner: Patient  Readiness: Acceptance  Method: Explanation, Demonstration, Handout  Response: Verbalizes Understanding    Education Comments  No comments found.

## 2024-08-07 ENCOUNTER — PHARMACY VISIT (OUTPATIENT)
Dept: PHARMACY | Facility: CLINIC | Age: 59
End: 2024-08-07
Payer: COMMERCIAL

## 2024-08-07 ENCOUNTER — DOCUMENTATION (OUTPATIENT)
Dept: HOME HEALTH SERVICES | Facility: HOME HEALTH | Age: 59
End: 2024-08-07
Payer: COMMERCIAL

## 2024-08-07 VITALS
WEIGHT: 152.12 LBS | TEMPERATURE: 97.8 F | HEART RATE: 69 BPM | OXYGEN SATURATION: 96 % | HEIGHT: 59 IN | BODY MASS INDEX: 30.67 KG/M2 | SYSTOLIC BLOOD PRESSURE: 131 MMHG | RESPIRATION RATE: 20 BRPM | DIASTOLIC BLOOD PRESSURE: 64 MMHG

## 2024-08-07 PROBLEM — T84.9XXA DISORDER OF JOINT PROSTHESIS (CMS-HCC): Status: ACTIVE | Noted: 2024-08-07

## 2024-08-07 PROBLEM — M70.60 GREATER TROCHANTERIC BURSITIS: Status: ACTIVE | Noted: 2022-08-11

## 2024-08-07 PROBLEM — T84.018A: Status: RESOLVED | Noted: 2024-06-30 | Resolved: 2024-08-07

## 2024-08-07 PROBLEM — Z96.642 S/P TOTAL LEFT HIP ARTHROPLASTY: Status: RESOLVED | Noted: 2024-08-06 | Resolved: 2024-08-07

## 2024-08-07 PROBLEM — M16.12 UNILATERAL PRIMARY OSTEOARTHRITIS, LEFT HIP: Status: RESOLVED | Noted: 2024-08-06 | Resolved: 2024-08-07

## 2024-08-07 PROBLEM — N39.46 MIXED INCONTINENCE: Status: ACTIVE | Noted: 2017-06-27

## 2024-08-07 PROBLEM — E88.810 METABOLIC SYNDROME: Status: ACTIVE | Noted: 2017-10-25

## 2024-08-07 LAB
ANION GAP SERPL CALC-SCNC: 12 MMOL/L (ref 10–20)
BUN SERPL-MCNC: 12 MG/DL (ref 6–23)
CALCIUM SERPL-MCNC: 8.2 MG/DL (ref 8.6–10.3)
CHLORIDE SERPL-SCNC: 106 MMOL/L (ref 98–107)
CO2 SERPL-SCNC: 23 MMOL/L (ref 21–32)
CREAT SERPL-MCNC: 0.64 MG/DL (ref 0.5–1.05)
EGFRCR SERPLBLD CKD-EPI 2021: >90 ML/MIN/1.73M*2
ERYTHROCYTE [DISTWIDTH] IN BLOOD BY AUTOMATED COUNT: 12.1 % (ref 11.5–14.5)
GLUCOSE SERPL-MCNC: 113 MG/DL (ref 74–99)
HCT VFR BLD AUTO: 38.5 % (ref 36–46)
HGB BLD-MCNC: 11.9 G/DL (ref 12–16)
MCH RBC QN AUTO: 31.5 PG (ref 26–34)
MCHC RBC AUTO-ENTMCNC: 30.9 G/DL (ref 32–36)
MCV RBC AUTO: 102 FL (ref 80–100)
NRBC BLD-RTO: 0 /100 WBCS (ref 0–0)
PLATELET # BLD AUTO: 158 X10*3/UL (ref 150–450)
POTASSIUM SERPL-SCNC: 3.8 MMOL/L (ref 3.5–5.3)
RBC # BLD AUTO: 3.78 X10*6/UL (ref 4–5.2)
SODIUM SERPL-SCNC: 137 MMOL/L (ref 136–145)
WBC # BLD AUTO: 8.9 X10*3/UL (ref 4.4–11.3)

## 2024-08-07 PROCEDURE — 97165 OT EVAL LOW COMPLEX 30 MIN: CPT | Mod: GO | Performed by: OCCUPATIONAL THERAPIST

## 2024-08-07 PROCEDURE — 82374 ASSAY BLOOD CARBON DIOXIDE: CPT | Performed by: PHYSICIAN ASSISTANT

## 2024-08-07 PROCEDURE — 97530 THERAPEUTIC ACTIVITIES: CPT | Mod: GP,CQ

## 2024-08-07 PROCEDURE — 85027 COMPLETE CBC AUTOMATED: CPT | Performed by: PHYSICIAN ASSISTANT

## 2024-08-07 PROCEDURE — 2500000004 HC RX 250 GENERAL PHARMACY W/ HCPCS (ALT 636 FOR OP/ED): Mod: JZ | Performed by: PHYSICIAN ASSISTANT

## 2024-08-07 PROCEDURE — 97116 GAIT TRAINING THERAPY: CPT | Mod: GP,CQ

## 2024-08-07 PROCEDURE — 97535 SELF CARE MNGMENT TRAINING: CPT | Mod: GO | Performed by: OCCUPATIONAL THERAPIST

## 2024-08-07 PROCEDURE — 2500000001 HC RX 250 WO HCPCS SELF ADMINISTERED DRUGS (ALT 637 FOR MEDICARE OP): Performed by: PHYSICIAN ASSISTANT

## 2024-08-07 PROCEDURE — 80048 BASIC METABOLIC PNL TOTAL CA: CPT | Performed by: PHYSICIAN ASSISTANT

## 2024-08-07 PROCEDURE — 9420000001 HC RT PATIENT EDUCATION 5 MIN

## 2024-08-07 PROCEDURE — 36415 COLL VENOUS BLD VENIPUNCTURE: CPT | Performed by: PHYSICIAN ASSISTANT

## 2024-08-07 PROCEDURE — 97110 THERAPEUTIC EXERCISES: CPT | Mod: GP,CQ

## 2024-08-07 ASSESSMENT — COGNITIVE AND FUNCTIONAL STATUS - GENERAL
TURNING FROM BACK TO SIDE WHILE IN FLAT BAD: A LITTLE
MOVING TO AND FROM BED TO CHAIR: A LITTLE
DRESSING REGULAR UPPER BODY CLOTHING: A LITTLE
DAILY ACTIVITIY SCORE: 19
WALKING IN HOSPITAL ROOM: A LITTLE
TOILETING: A LITTLE
WALKING IN HOSPITAL ROOM: A LITTLE
PERSONAL GROOMING: A LITTLE
MOBILITY SCORE: 18
DRESSING REGULAR LOWER BODY CLOTHING: A LITTLE
MOVING FROM LYING ON BACK TO SITTING ON SIDE OF FLAT BED WITH BEDRAILS: A LITTLE
MOVING TO AND FROM BED TO CHAIR: A LITTLE
CLIMB 3 TO 5 STEPS WITH RAILING: A LITTLE
TURNING FROM BACK TO SIDE WHILE IN FLAT BAD: A LITTLE
CLIMB 3 TO 5 STEPS WITH RAILING: A LOT
STANDING UP FROM CHAIR USING ARMS: A LITTLE
STANDING UP FROM CHAIR USING ARMS: A LITTLE
HELP NEEDED FOR BATHING: A LITTLE
DAILY ACTIVITIY SCORE: 22
MOBILITY SCORE: 17
HELP NEEDED FOR BATHING: A LITTLE
DRESSING REGULAR LOWER BODY CLOTHING: A LITTLE
MOVING FROM LYING ON BACK TO SITTING ON SIDE OF FLAT BED WITH BEDRAILS: A LITTLE

## 2024-08-07 ASSESSMENT — PAIN - FUNCTIONAL ASSESSMENT
PAIN_FUNCTIONAL_ASSESSMENT: 0-10

## 2024-08-07 ASSESSMENT — ACTIVITIES OF DAILY LIVING (ADL)
HOME_MANAGEMENT_TIME_ENTRY: 14
ADL_ASSISTANCE: INDEPENDENT

## 2024-08-07 ASSESSMENT — PAIN SCALES - GENERAL
PAINLEVEL_OUTOF10: 2
PAINLEVEL_OUTOF10: 2
PAINLEVEL_OUTOF10: 3
PAINLEVEL_OUTOF10: 2

## 2024-08-07 ASSESSMENT — PAIN SCALES - WONG BAKER: WONGBAKER_NUMERICALRESPONSE: HURTS LITTLE BIT

## 2024-08-07 NOTE — CARE PLAN
The patient's goals for the shift include patient safety    The clinical goals for the shift include PT and pain control      Problem: Pain - Adult  Goal: Verbalizes/displays adequate comfort level or baseline comfort level  Outcome: Met     Problem: Safety - Adult  Goal: Free from fall injury  Outcome: Met     Problem: Chronic Conditions and Co-morbidities  Goal: Patient's chronic conditions and co-morbidity symptoms are monitored and maintained or improved  Outcome: Met     Problem: Fall/Injury  Goal: Not fall by end of shift  Outcome: Met

## 2024-08-07 NOTE — NURSING NOTE
Met with Patient and Care Partner at bedside- Patient is s/p Left Revision Total Hip Replacement with Dr. Muniz. Discussion with patient included education on the following topics: TJR Education: Wound Care, Post-Op Activity, Post-Op Precautions, Cold-Therapy, Importance of post-op prescriptions, When to call the Surgeon's Office, Use of MyChart, and When to call 9-1-1. Patient completed online joint class prior to surgery. Patient is able to verbalize understanding of class content/discussion. Contact information was provided to patient for support and assistance during the post-operative period. Met with patient, discussed total hip replacement, precautions, and activity/assistive device. Reviewed constipation, pain management/medications, and cold therapy. Reviewed signs/symptoms of infection, when to call MD/navigator, when to call 911. Discussed DVT prophylaxis and discharge planning. Reviewed the Dragonfly List Chart navigation platform. Patient verbalizes understanding of when to call Navigator/MD with questions or concerns following discharge. She was give My Medication Education tool as a reference for her discharge medications.

## 2024-08-07 NOTE — PROGRESS NOTES
Occupational Therapy    Evaluation/Treatment    Patient Name: Ashley Ngo  MRN: [id number]  : 1965  Today's Date: 24  Time Calculation  Start Time: 953  Stop Time:   Time Calculation (min): 34 min       Assessment:  OT Assessment: Pt s/p L MISA revision and demos decreased balance, strength, endurance, L LE ROM and safety awareness with new L LE 25% PWB restriction and posterior hip precautions resulting in decreased safety and independence with ADLs/IADLs. Pt requires skilled OT services to address above deficits to safely return to PLOF.  Prognosis: Good  Evaluation/Treatment Tolerance: Patient tolerated treatment well  Medical Staff Made Aware: Yes  End of Session Communication: Bedside nurse  End of Session Patient Position: Up in chair, Alarm on  OT Assessment Results: Decreased ADL status, Decreased safe judgment during ADL, Decreased endurance, Decreased IADLs, Decreased trunk control for functional activities, Decreased functional mobility  Prognosis: Good  Evaluation/Treatment Tolerance: Patient tolerated treatment well  Medical Staff Made Aware: Yes  Strengths: Premorbid level of function, Support and attitude of living partners, Ability to acquire knowledge, Access to adaptive/assistive products  Plan:  Treatment Interventions: ADL retraining, Functional transfer training, Endurance training, UE strengthening/ROM, Patient/family training, Equipment evaluation/education, Compensatory technique education  OT Frequency: 3 times per week  OT Discharge Recommendations: Low intensity level of continued care  OT Recommended Transfer Status: Assist of 1  OT - OK to Discharge: Yes (OT POC established this date)  Treatment Interventions: ADL retraining, Functional transfer training, Endurance training, UE strengthening/ROM, Patient/family training, Equipment evaluation/education, Compensatory technique education    Subjective     General:   OT Received On: 24  General  Reason for  Referral: Pt is a 59 y/o F s/p L MISA revision on 8/6/24.  Referred By: KAREN Muniz  Past Medical History Relevant to Rehab:   Past Medical History:   Diagnosis Date    Broken internal joint prosthesis (CMS-HCC)     Bursitis     hips    GERD (gastroesophageal reflux disease)     Hip pain     HLD (hyperlipidemia)     patient denies    HTN (hypertension)     Hypothyroidism     JRA (juvenile rheumatoid arthritis) (Multi)     OA (osteoarthritis)     PONV (postoperative nausea and vomiting)       Family/Caregiver Present: No  Prior to Session Communication: Bedside nurse  Patient Position Received: Up in chair, Alarm on  General Comment: Pt seated in chair upon arrival and agreeable to OT eval/tx. Pt fully participatory in session.  Precautions:  LE Weight Bearing Status: Left Partial Weight Bearing (25% PWB)  Medical Precautions: Fall precautions  Post-Surgical Precautions: Left hip precautions (posterior)    Pain:  Pain Assessment  Pain Assessment: 0-10  0-10 (Numeric) Pain Score: 2  Pain Type: Surgical pain  Pain Location: Hip  Pain Orientation: Left    Objective   Cognition:  Overall Cognitive Status: Within Functional Limits  Orientation Level: Oriented X4  Insight: Within function limits  Impulsive: Within functional limits           Home Living:  Type of Home: House  Lives With: Spouse  Home Adaptive Equipment: Walker rolling or standard, Reacher, Sock aid, Long-handled shoehorn, Long-handled sponge  Home Layout: One level  Home Access: Stairs to enter with rails  Entrance Stairs-Rails: Left  Entrance Stairs-Number of Steps: 2  Bathroom Shower/Tub: Walk-in shower  Bathroom Toilet: Handicapped height  Bathroom Equipment: Shower chair without back  Prior Function:  Level of [place]: Independent with ADLs and functional transfers, Independent with homemaking with ambulation  Receives Help From: Family  ADL Assistance: Independent  Homemaking Assistance: Independent  Ambulatory Assistance: Independent (no  AD)  Vocational:  (Pt works in customer service)  Leisure: Pt enjoys shopping and trying new restuaraunts  Prior Function Comments: + driving. x1 recent fall in closet tripping on shoes which led to this hospitalization.       ADL:  Grooming Assistance: Stand by  Grooming Deficit: Wash/dry hands, Teeth care, Verbal cueing, Supervision/safety (cues for L LE positioning and walker safety while standing at sink, pt appears to adhere to L LE 25% PWB restriction)  UE Dressing Deficit: Setup (to doff gown, don bra and pullover garment)  LE Dressing Assistance: Minimal  LE Dressing Deficit: Use of adaptive equipment (to don undergarment/pants, shoes and socks, cues for correct use of AE and compensatory techniques to adhere to hip precautions)  Toileting Assistance with Device: Stand by  Toileting Deficit:  (cues for sequencing, L LE Position, safe hand placement and compensatory techniques to adhere to hip precautions)  Activities of Daily Living: Grooming  Grooming Level of Assistance: Close supervision, Minimal verbal cues  Grooming Where Assessed: Standing sinkside  Grooming Comments: cues for L LE positioning and walker safety to adhere to L LE 25% PWB and hip precautions, pt performed oral hygiene and hand hygiene    UE Dressing  UE Dressing Level of Assistance: Setup  UE Dressing Where Assessed: Chair  UE Dressing Comments: to don bra and pullover garment    LE Dressing  LE Dressing: Yes  LE Dressing Adaptive Equipment: Reacher, Sock aide, Shoe horn  Pants Level of Assistance: Minimum assistance, Minimal verbal cues  Sock Level of Assistance: Minimum assistance, Minimal verbal cues  Shoe Level of Assistance: Minimum assistance, Minimal verbal cues  LE Dressing Where Assessed: Chair  LE Dressing Comments: cues for sequencing, compensatory techniques and correct use of AE to adhere to posterior hip precautions    Toileting  Toileting Level of Assistance: Close supervision, Minimal verbal cues  Where Assessed:  Toilet  Toileting Comments: cues for body positioning and compensatory techniques to adhere to hip precautions  Activity Tolerance:  Endurance: Tolerates 30+ min exercise without fatigue    Bed Mobility/Transfers: Bed Mobility  Bed Mobility: No (pt seated in chair upon arrival, pt reports no concerns with bed mobility)    Transfers  Transfer: Yes  Transfer 1  Technique 1: Sit to stand, Stand to sit  Transfer Device 1: Walker  Transfer Level of Assistance 1: Close supervision, Minimal verbal cues  Trials/Comments 1: cues for sequencing, safe hand placement, L LE positioning and walker safety    Toilet Transfers  Toilet Transfer Type: To and from  Toilet Transfer to: Raised toilet seat with rails  Toilet Transfer Technique: Ambulating  Toilet Transfers: Supervision  Toilet Transfers Comments: cues for sequencing, safe hand placement, walker safety and L LE Positioning    Functional Mobility:  Functional Mobility  Functional Mobility Performed: Yes  Functional Mobility 1  Comments 1: Pt functionally navigated x min household distance in room using FWW with SBA. Cues for sequencing, L LE positioning to adhere to hip precautions, to increase WB through B UEs and for B foot positioning to adhere to L LE 25% PWB and walker safety. No LOB noted.       Standing Balance:  Static Standing Balance  Static Standing-Balance Support: Bilateral upper extremity supported  Static Standing-Level of Assistance:  (SUP)  Static Standing-Comment/Number of Minutes: using FWW  Dynamic Standing Balance  Dynamic Standing-Comments: SBA using FWW      Sensation:  Light Touch: No apparent deficits  Strength:  Strength Comments: B UEs grossly WFL    Coordination:  Movements are Fluid and Coordinated: Yes        Extremities: RUE   RUE : Within Functional Limits and LUE   LUE: Within Functional Limits      Outcome Measures: [facility] Daily Activity  Putting on and taking off regular lower body clothing: A little  Bathing (including washing, rinsing,  drying): A little  Putting on and taking off regular upper body clothing: A little  Toileting, which includes using toilet, bedpan or urinal: A little  Taking care of personal grooming such as brushing teeth: A little  Eating Meals: None  Daily Activity - Total Score: 19        Education Documentation  Handouts, taught by Cee Portillo OT at 8/7/2024 11:21 AM.  Learner: Patient  Readiness: Acceptance  Method: Explanation, Demonstration, Handout  Response: Verbalizes Understanding    Body Mechanics, taught by Cee Portillo OT at 8/7/2024 11:21 AM.  Learner: Patient  Readiness: Acceptance  Method: Explanation, Demonstration, Handout  Response: Verbalizes Understanding    Precautions, taught by Cee Portillo OT at 8/7/2024 11:21 AM.  Learner: Patient  Readiness: Acceptance  Method: Explanation, Demonstration, Handout  Response: Verbalizes Understanding    ADL Training, taught by Cee Portillo OT at 8/7/2024 11:21 AM.  Learner: Patient  Readiness: Acceptance  Method: Explanation, Demonstration, Handout  Response: Verbalizes Understanding    Education Comments  No comments found.           Goals:  Encounter Problems       Encounter Problems (Active)       ADLs       Patient will perform UB and LB bathing with modified independent level of assistance and grab bars, shower chair, and long-handled sponge.       Start:  08/07/24    Expected End:  08/21/24            Patient with complete lower body dressing with modified independent level of assistance donning and doffing all LE clothes  with PRN adaptive equipment.       Start:  08/07/24    Expected End:  08/21/24            Patient will complete toileting including hygiene clothing management/hygiene with modified independent level of assistance and raised toilet seat and grab bars.       Start:  08/07/24    Expected End:  08/21/24               MOBILITY       Patient will perform Functional mobility x Household distances/Community Distances with modified  independent level of assistance and least restrictive device in order to improve safety and functional mobility.       Start:  08/07/24    Expected End:  08/21/24               TRANSFERS       Patient will complete functional transfers with least restrictive device with modified independent level of assistance.       Start:  08/07/24    Expected End:  08/21/24

## 2024-08-07 NOTE — NURSING NOTE
Interdisciplinary team present: NP, PT, NM, CC, SW, Orthopedic Coordinator, and bedside RN.  Pain - controlled  Nausea - none  Discharge barrier - needs to do stairs with PT  Discharge plan - Home with [facility]  Discharge date/time - today or tomorrow depending on PT and medical clearance

## 2024-08-07 NOTE — CARE PLAN
The patient's goals for the shift include patient safety    The clinical goals for the shift include patient comfort will be maintained and safety maintained    Problem: Fall/Injury  Goal: Be free from injury by end of the shift  Outcome: Progressing     Problem: Pain  Goal: Walks with improved pain control throughout the shift  Outcome: Progressing

## 2024-08-07 NOTE — PROGRESS NOTES
08/07/24 1103   Discharge Planning   Living Arrangements Spouse/significant other   Support Systems Spouse/significant other   Assistance Needed normally independent with care at home   Type of Residence Private residence   Who is requesting discharge planning? Provider   Home or Post Acute Services In home services   Type of Home Care Services Home OT;Home PT   Expected Discharge Disposition  Services   Does the patient need discharge transport arranged? No   Patient Choice   Provider Choice list and CMS website (https://medicare.gov/care-compare#search) for post-acute Quality and Resource Measure Data were provided and reviewed with: Patient     Attempted to meet with patient at bedside  She is currently working with pt/ot  Admitted for total L hip repair    [facility] ordered. Reached out to [facility] to confirm SOC

## 2024-08-07 NOTE — HH CARE COORDINATION
Home Care received a Referral for Physical Therapy and Occupational Therapy. We have processed the referral for a Start of Care on 8/8/24.     If you have any questions or concerns, please feel free to contact us at [phone]. Follow the prompts, enter your five digit zip code, and you will be directed to your care team on [facility] 2.

## 2024-08-08 ENCOUNTER — TELEPHONE (OUTPATIENT)
Dept: ORTHOPEDIC SURGERY | Facility: HOSPITAL | Age: 59
End: 2024-08-08
Payer: COMMERCIAL

## 2024-08-08 ENCOUNTER — HOME CARE VISIT (OUTPATIENT)
Dept: HOME HEALTH SERVICES | Facility: HOME HEALTH | Age: 59
End: 2024-08-08
Payer: COMMERCIAL

## 2024-08-08 VITALS
OXYGEN SATURATION: 98 % | HEART RATE: 71 BPM | DIASTOLIC BLOOD PRESSURE: 70 MMHG | TEMPERATURE: 97.7 F | SYSTOLIC BLOOD PRESSURE: 123 MMHG

## 2024-08-08 LAB
BACTERIA SPEC CULT: NORMAL
GRAM STN SPEC: NORMAL
GRAM STN SPEC: NORMAL

## 2024-08-08 PROCEDURE — G0151 HHCP-SERV OF PT,EA 15 MIN: HCPCS

## 2024-08-08 ASSESSMENT — ENCOUNTER SYMPTOMS
PAIN LOCATION: LEFT HIP
PAIN LOCATION - PAIN SEVERITY: 2/10
PERSON REPORTING PAIN: PATIENT
PAIN: 1

## 2024-08-09 LAB
FUNGUS SPEC CULT: NORMAL
FUNGUS SPEC FUNGUS STN: NORMAL
LABORATORY COMMENT REPORT: NORMAL
Lab: NORMAL
PATH REPORT.FINAL DX SPEC: NORMAL
PATH REPORT.GROSS SPEC: NORMAL
PATH REPORT.RELEVANT HX SPEC: NORMAL
PATH REPORT.TOTAL CANCER: NORMAL

## 2024-08-09 ASSESSMENT — ACTIVITIES OF DAILY LIVING (ADL)
ENTERING_EXITING_HOME: NEEDS ASSISTANCE
OASIS_M1830: 03

## 2024-08-10 ENCOUNTER — HOME CARE VISIT (OUTPATIENT)
Dept: HOME HEALTH SERVICES | Facility: HOME HEALTH | Age: 59
End: 2024-08-10
Payer: COMMERCIAL

## 2024-08-10 VITALS
DIASTOLIC BLOOD PRESSURE: 70 MMHG | HEART RATE: 83 BPM | SYSTOLIC BLOOD PRESSURE: 124 MMHG | TEMPERATURE: 98.2 F | OXYGEN SATURATION: 97 %

## 2024-08-10 PROCEDURE — G0152 HHCP-SERV OF OT,EA 15 MIN: HCPCS

## 2024-08-10 ASSESSMENT — PAIN SCALES - PAIN ASSESSMENT IN ADVANCED DEMENTIA (PAINAD)
TOTALSCORE: 0
CONSOLABILITY: 0 - NO NEED TO CONSOLE.
CONSOLABILITY: 0
NEGVOCALIZATION: 0 - NONE.
FACIALEXPRESSION: 0 - SMILING OR INEXPRESSIVE.
BODYLANGUAGE: 0
NEGVOCALIZATION: 0
FACIALEXPRESSION: 0
BODYLANGUAGE: 0 - RELAXED.
BREATHING: 0

## 2024-08-10 ASSESSMENT — ACTIVITIES OF DAILY LIVING (ADL)
GROOMING_WITHIN_DEFINED_LIMITS: 1
BATHING ASSESSED: 1
DRESSING_LB_CURRENT_FUNCTION: INDEPENDENT
TOILETING: 1
TOILETING: INDEPENDENT
BATHING_CURRENT_FUNCTION: INDEPENDENT
FEEDING_WITHIN_DEFINED_LIMITS: 1

## 2024-08-10 ASSESSMENT — ENCOUNTER SYMPTOMS
PERSON REPORTING PAIN: PATIENT
DENIES PAIN: 1

## 2024-08-12 LAB
FUNGUS SPEC CULT: NORMAL
FUNGUS SPEC FUNGUS STN: NORMAL

## 2024-08-13 ENCOUNTER — HOME CARE VISIT (OUTPATIENT)
Dept: HOME HEALTH SERVICES | Facility: HOME HEALTH | Age: 59
End: 2024-08-13
Payer: COMMERCIAL

## 2024-08-13 DIAGNOSIS — Z96.642 S/P TOTAL LEFT HIP ARTHROPLASTY: ICD-10-CM

## 2024-08-13 PROCEDURE — G0151 HHCP-SERV OF PT,EA 15 MIN: HCPCS

## 2024-08-13 RX ORDER — OXYCODONE HYDROCHLORIDE 5 MG/1
5 TABLET ORAL EVERY 6 HOURS PRN
Qty: 28 TABLET | Refills: 0 | Status: SHIPPED | OUTPATIENT
Start: 2024-08-13 | End: 2024-08-20

## 2024-08-13 RX ORDER — TRAMADOL HYDROCHLORIDE 50 MG/1
50 TABLET ORAL EVERY 6 HOURS PRN
Qty: 28 TABLET | Refills: 0 | Status: SHIPPED | OUTPATIENT
Start: 2024-08-13 | End: 2024-08-20

## 2024-08-14 ASSESSMENT — ENCOUNTER SYMPTOMS
HIGHEST PAIN SEVERITY IN PAST 24 HOURS: 1/10
PAIN LOCATION: LEFT HIP
PAIN: 1
PERSON REPORTING PAIN: PATIENT

## 2024-08-15 ENCOUNTER — HOME CARE VISIT (OUTPATIENT)
Dept: HOME HEALTH SERVICES | Facility: HOME HEALTH | Age: 59
End: 2024-08-15
Payer: COMMERCIAL

## 2024-08-15 VITALS — TEMPERATURE: 98.2 F

## 2024-08-15 PROCEDURE — G0151 HHCP-SERV OF PT,EA 15 MIN: HCPCS

## 2024-08-19 LAB
FUNGUS SPEC CULT: NORMAL
FUNGUS SPEC FUNGUS STN: NORMAL

## 2024-08-20 ENCOUNTER — HOME CARE VISIT (OUTPATIENT)
Dept: HOME HEALTH SERVICES | Facility: HOME HEALTH | Age: 59
End: 2024-08-20
Payer: COMMERCIAL

## 2024-08-20 DIAGNOSIS — Z96.642 S/P TOTAL LEFT HIP ARTHROPLASTY: Primary | ICD-10-CM

## 2024-08-20 PROCEDURE — G0151 HHCP-SERV OF PT,EA 15 MIN: HCPCS

## 2024-08-20 RX ORDER — TRAMADOL HYDROCHLORIDE 50 MG/1
50 TABLET ORAL EVERY 6 HOURS PRN
Qty: 28 TABLET | Refills: 0 | Status: SHIPPED | OUTPATIENT
Start: 2024-08-20 | End: 2024-08-27

## 2024-08-20 ASSESSMENT — ENCOUNTER SYMPTOMS
PERSON REPORTING PAIN: PATIENT
DENIES PAIN: 1

## 2024-08-22 ENCOUNTER — HOME CARE VISIT (OUTPATIENT)
Dept: HOME HEALTH SERVICES | Facility: HOME HEALTH | Age: 59
End: 2024-08-22
Payer: COMMERCIAL

## 2024-08-22 VITALS — OXYGEN SATURATION: 98 % | HEART RATE: 72 BPM

## 2024-08-22 PROCEDURE — G0151 HHCP-SERV OF PT,EA 15 MIN: HCPCS

## 2024-08-23 DIAGNOSIS — Z96.641 S/P TOTAL RIGHT HIP ARTHROPLASTY: Primary | ICD-10-CM

## 2024-08-23 ASSESSMENT — ACTIVITIES OF DAILY LIVING (ADL)
OASIS_M1830: 00
HOME_HEALTH_OASIS: 00

## 2024-08-23 ASSESSMENT — ENCOUNTER SYMPTOMS
PAIN: 1
PAIN LOCATION: LEFT HIP
PAIN LOCATION - PAIN SEVERITY: 2/10
PERSON REPORTING PAIN: PATIENT

## 2024-08-26 LAB
FUNGUS SPEC CULT: NORMAL
FUNGUS SPEC FUNGUS STN: NORMAL

## 2024-08-29 DIAGNOSIS — Z96.642 S/P TOTAL LEFT HIP ARTHROPLASTY: Primary | ICD-10-CM

## 2024-08-29 RX ORDER — TRAMADOL HYDROCHLORIDE 50 MG/1
50 TABLET ORAL EVERY 6 HOURS PRN
Qty: 28 TABLET | Refills: 0 | Status: SHIPPED | OUTPATIENT
Start: 2024-08-29 | End: 2024-09-05

## 2024-09-19 ENCOUNTER — OFFICE VISIT (OUTPATIENT)
Dept: ORTHOPEDIC SURGERY | Facility: CLINIC | Age: 59
End: 2024-09-19
Payer: COMMERCIAL

## 2024-09-19 ENCOUNTER — HOSPITAL ENCOUNTER (OUTPATIENT)
Dept: RADIOLOGY | Facility: CLINIC | Age: 59
Discharge: HOME | End: 2024-09-19
Payer: COMMERCIAL

## 2024-09-19 DIAGNOSIS — Z47.1 AFTERCARE FOLLOWING LEFT HIP JOINT REPLACEMENT SURGERY: ICD-10-CM

## 2024-09-19 DIAGNOSIS — Z96.642 AFTERCARE FOLLOWING LEFT HIP JOINT REPLACEMENT SURGERY: ICD-10-CM

## 2024-09-19 PROCEDURE — 99211 OFF/OP EST MAY X REQ PHY/QHP: CPT | Performed by: PHYSICIAN ASSISTANT

## 2024-09-19 PROCEDURE — 73502 X-RAY EXAM HIP UNI 2-3 VIEWS: CPT | Mod: LT

## 2024-09-19 PROCEDURE — 73502 X-RAY EXAM HIP UNI 2-3 VIEWS: CPT | Mod: LEFT SIDE | Performed by: RADIOLOGY

## 2024-09-19 ASSESSMENT — PAIN - FUNCTIONAL ASSESSMENT: PAIN_FUNCTIONAL_ASSESSMENT: NO/DENIES PAIN

## 2024-09-19 NOTE — PROGRESS NOTES
KOMAL Vasquez, JONES, ATC  Adult Reconstruction and Joint Replacement Surgery  Phone: [phone]     Fax:[phone]            Chief Complaint   Patient presents with    Left Hip - Post-op       HPI:    Ashley Ngo is a pleasant 59 y.o. year-old female here for follow-up of their left revision cup. She has been doing well.  She had no wound healing issues or complications after surgery.  She is currently 25% weightbearing and will be for the next 2 weeks.  She is doing outpatient therapy.  She he is not on any pain medicine.  Review of Systems  Past Medical History:   Diagnosis Date    Broken internal joint prosthesis (CMS-HCC)     Bursitis     hips    GERD (gastroesophageal reflux disease)     Hip pain     HLD (hyperlipidemia)     patient denies    HTN (hypertension)     Hypothyroidism     JRA (juvenile rheumatoid arthritis) (Multi)     OA (osteoarthritis)     PONV (postoperative nausea and vomiting)      Patient Active Problem List   Diagnosis    Failed total hip arthroplasty, initial encounter (CMS-HCC)    Greater trochanteric bursitis    History of total hip replacement    Hypertension    Hypothyroid    Gastroesophageal reflux disease    Osteoarthritis    Rheumatoid arthritis (Multi)    Metabolic syndrome    Mixed hyperlipidemia    Mixed incontinence    Disorder of joint prosthesis (CMS-HCC)     Medication Documentation Review Audit       Reviewed by Jackelyn Palomares LPN (Licensed Nurse) on 24 at 1058      Medication Order Taking? Sig Documenting Provider Last Dose Status   amLODIPine (Norvasc) 5 mg tablet [id number] Yes 1 tablet Orally Once a day Historical Provider, MD Taking Active   apixaban (Eliquis) 2.5 mg tablet [id number]  Take 1 tablet (2.5 mg) by mouth 2 times a day. Ashley Pak PA-C   24 1182   calcium carbonate EX (Tums Extra Strength) 300 mg (750 mg) chewable tablet [id number]  Chew 300 mg 3 times a day as needed for indigestion or heartburn. Historical  Provider, MD  Active   famotidine (Pepcid) 20 mg tablet [id number]  Take by mouth 2 times a day as needed for heartburn. Historical Provider, MD  Active   glucosam/chond-msm1/C/erik/bor (GLUCOSAMINE-CHOND-MSM COMPLEX ORAL) [id number] Yes Take 1 capsule by mouth once daily. Historical Provider, MD Taking Active   levothyroxine (Synthroid, Levoxyl) 112 mcg tablet [id number] Yes Take 1 tablet (112 mcg) by mouth once every 24 hours. Historical Provider, MD Taking Active   losartan-hydrochlorothiazide (Hyzaar) 100-12.5 mg tablet [id number] Yes Take 1 tablet by mouth once every 24 hours. Historical Provider, MD Taking Active   naproxen (Naprosyn) 250 mg tablet [id number] Yes Take 1 tablet (250 mg) by mouth 2 times daily (morning and late afternoon). Historical Provider, MD Taking Active   oxybutynin XL (Ditropan-XL) 5 mg 24 hr tablet [id number] Yes 1 tab(s) orally once a day (oxybutynin XL) Historical Provider, MD Taking Active   pantoprazole (ProtoNix) 40 mg EC tablet [id number]  Take 1 tablet (40 mg) by mouth once daily. Do not crush, chew, or split. Ashley Pak PA-C   24 2359   polyethylene glycol (Glycolax, Miralax) 17 gram/dose powder [id number]  Mix 1 cap (17g) into 8 ounces of fluid and drink by mouth once daily. Ashley Pak PA-C  Active                  Allergies   Allergen Reactions    Meloxicam Hives and Rash     Social History     Socioeconomic History    Marital status:      Spouse name: Not on file    Number of children: Not on file    Years of education: Not on file    Highest education level: Not on file   Occupational History    Not on file   Tobacco Use    Smoking status: Never     Passive exposure: Never    Smokeless tobacco: Never   Vaping Use    Vaping status: Never Used   Substance and Sexual Activity    Alcohol use: Not Currently     Comment: socially 2x/week, 2-3 cocktails    Drug use: Never    Sexual activity: Defer   Other Topics Concern    Not on file   Social History  Narrative    Not on file     Social Determinants of Health     Financial Resource Strain: Low Risk  (8/6/2024)    Overall Financial Resource Strain (CARDIA)     Difficulty of Paying Living Expenses: Not hard at all   Food Insecurity: No Food Insecurity (3/3/2023)    Received from [facility]    Hunger Vital Sign     Worried About Running Out of Food in the Last Year: Never true     Ran Out of Food in the Last Year: Never true   Transportation Needs: No Transportation Needs (8/22/2024)    OASIS : Transportation     Lack of Transportation (Medical): No     Lack of Transportation (Non-Medical): No     Patient Unable or Declines to Respond: No   Physical Activity: Inactive (3/3/2023)    Received from [facility]    Exercise Vital Sign     Days of Exercise per Week: 0 days     Minutes of Exercise per Session: 0 min   Stress: No Stress Concern Present (3/3/2023)    Received from [facility]    [employer] [place] [employer] - Occupational Stress Questionnaire     Feeling of Stress : Not at all   Social Connections: Feeling Socially Integrated (8/22/2024)    OASIS : Social Isolation     Frequency of experiencing loneliness or isolation: Never   Intimate Partner Violence: Not on file   Housing Stability: Low Risk  (8/6/2024)    Housing Stability Vital Sign     Unable to Pay for Housing in the Last Year: No     Number of Times Moved in the Last Year: 1     Homeless in the Last Year: No     Past Surgical History:   Procedure Laterality Date    BI US GUIDED BREAST LOCALIZATION AND BIOPSY LEFT Left 04/20/2021    BI US GUIDED BREAST LOCALIZATION AND BIOPSY LEFT [facility]    BREAST BIOPSY Left 04/16/2021    COLONOSCOPY      ECTOPIC PREGNANCY SURGERY      ENDOMETRIAL ABLATION      HIP SURGERY Right 2017    JOINT REPLACEMENT Bilateral 1997    hips    TUBAL LIGATION      UPPER GASTROINTESTINAL ENDOSCOPY         Physical Exam  side:  left Hip  There were no vitals filed for this visit.  AxO x 3 in NAD, appears stated age. Cooperative.   Assistive Device: walker. Coordination and balance intact.  Skin inact over bilateral upper and lower extremities, no erythema, ecchymosis, temperature changes. No popliteal lymphadenopathy,  No other overlying lesion  mood: euthymic  Respirations non labored  Surgical hip demonstrates pain free ROM with mild tenderness to palpation over greater trochanter laterally.  The incision is midline healing well with no signs of surrounding infection, dehiscence or drainage.   Neurovascularly back to baseline  5/5 hip flexion/knee extension/DF/PF/EHL  SILT in nhung/saph/ per/tib distribution   Extremities warm and well perfused.  No lower extremity calf tenderness or swelling  2+ Femoral/DP/PT pulses bilaterally    IMAGING:  No images are attached to the encounter.    I personally reviewed multiple views of the hip today in clinic.  Status post side: left Total Hip Arthroplasty. The implant is well fixed, well aligned.  No evidence of mariela-implant fracture, lucency or dislocation. Leg lengths closely restored.    Impression/Plan:    Ashley MARINA Shlomodamon is doing well post-operatively and happy with the results of the operation.     S/P Total side: left Hip revision cup I talked with patient at length about activity precautions and dislocation precautions in detail. I talked with patient at length about activity precautions and dislocation precautions in detail which include avoidance of hip flexion > 90 degrees with simultaneous internal rotation.  Patient can progress activities as tolerated. And understands their permanent precautions. At this time, you may gradually increase your activities and get back to a normal, low-impact lifestyle. Please avoid running, jumping, and heavy lifting.    This was  demonstrated in the room by ROXANA and patient verbalized understanding. Patient should also limit running, jumping, weight  lifting and repetitive activity. The patient verbalizes understanding of these permanent precautions.        2. Continue HEP or outpatient PT, per protocol.    3. Continue Post-operative instructions.    4. Discussed the importance of dental prophylactic dental antibiotics lifelong. Patient may request medication refill through MyChart,       Pharmacy or surgeons office.  5.  We did discuss that she is to continue 25% weightbearing for the next 2 weeks.  She can drive now.  She also needs to refrain from using a bathtub for the next 2 weeks as well.  I like to see her back in 6 months with x-rays at that time.    All questions were answered.    Follow-up 6 months with xrays at next visit.      Ashley Pak MPAS, PA-C, ATC  Orthopedic Physician Assisant  Adult Reconstruction and Total Joint Replacement  General Orthopedics  Department of Orthopaedic Surgery  [place] [phone]  [employer] messaging preferred

## 2024-12-03 DIAGNOSIS — Z96.642 S/P TOTAL LEFT HIP ARTHROPLASTY: Primary | ICD-10-CM

## 2024-12-03 RX ORDER — AMOXICILLIN 500 MG/1
CAPSULE ORAL
Qty: 4 CAPSULE | Refills: 6 | Status: SHIPPED | OUTPATIENT
Start: 2024-12-03

## 2024-12-06 ASSESSMENT — ENCOUNTER SYMPTOMS
HEADACHES: 0
CHILLS: 0
BACK PAIN: 0
FEVER: 0
VOMITING: 0
ABDOMINAL PAIN: 0
NAUSEA: 0
FLANK PAIN: 0
HEMATURIA: 0
SORE THROAT: 0
CONSTIPATION: 1
DYSURIA: 0
ANOREXIA: 0
DIARRHEA: 0
FREQUENCY: 0

## 2024-12-12 ENCOUNTER — OFFICE VISIT (OUTPATIENT)
Dept: OBSTETRICS AND GYNECOLOGY | Facility: CLINIC | Age: 59
End: 2024-12-12
Payer: COMMERCIAL

## 2024-12-12 VITALS
BODY MASS INDEX: 32.09 KG/M2 | WEIGHT: 159.2 LBS | DIASTOLIC BLOOD PRESSURE: 75 MMHG | SYSTOLIC BLOOD PRESSURE: 115 MMHG | HEIGHT: 59 IN

## 2024-12-12 DIAGNOSIS — N95.0 PMB (POSTMENOPAUSAL BLEEDING): Primary | ICD-10-CM

## 2024-12-12 PROCEDURE — 99214 OFFICE O/P EST MOD 30 MIN: CPT | Performed by: OBSTETRICS & GYNECOLOGY

## 2024-12-12 PROCEDURE — 3078F DIAST BP <80 MM HG: CPT | Performed by: OBSTETRICS & GYNECOLOGY

## 2024-12-12 PROCEDURE — 1036F TOBACCO NON-USER: CPT | Performed by: OBSTETRICS & GYNECOLOGY

## 2024-12-12 PROCEDURE — 3074F SYST BP LT 130 MM HG: CPT | Performed by: OBSTETRICS & GYNECOLOGY

## 2024-12-12 PROCEDURE — 99204 OFFICE O/P NEW MOD 45 MIN: CPT | Performed by: OBSTETRICS & GYNECOLOGY

## 2024-12-12 PROCEDURE — 3008F BODY MASS INDEX DOCD: CPT | Performed by: OBSTETRICS & GYNECOLOGY

## 2024-12-12 ASSESSMENT — ENCOUNTER SYMPTOMS
LOSS OF SENSATION IN FEET: 0
DEPRESSION: 0
OCCASIONAL FEELINGS OF UNSTEADINESS: 0

## 2024-12-12 ASSESSMENT — PATIENT HEALTH QUESTIONNAIRE - PHQ9
2. FEELING DOWN, DEPRESSED OR HOPELESS: NOT AT ALL
SUM OF ALL RESPONSES TO PHQ9 QUESTIONS 1 AND 2: 0
1. LITTLE INTEREST OR PLEASURE IN DOING THINGS: NOT AT ALL

## 2024-12-12 ASSESSMENT — PAIN SCALES - GENERAL: PAINLEVEL_OUTOF10: 0-NO PAIN

## 2024-12-12 NOTE — PROGRESS NOTES
Ashley Ngo is a 59 y.o. female,  who presented with  AUB    Spotting   Irregular   No Cramping  No Pressure   Blood clots     LMP at age 48     Obstetrical History:  OB History          0    Para   0    Term   0            AB        Living             SAB        IAB        Ectopic        Multiple        Live Births                        Gynecological history:  Menarche: 11   years old   Periods menopause            Vaginal discharge    No  Menopausal symptoms No        Last Pap:  History of abnormal pap exams? Yes      Past Medical History:   Diagnosis Date    Broken internal joint prosthesis     Bursitis     hips    GERD (gastroesophageal reflux disease)     Hip pain     HLD (hyperlipidemia)     patient denies    HTN (hypertension)     Hypothyroidism     JRA (juvenile rheumatoid arthritis) (Multi)     OA (osteoarthritis)     PONV (postoperative nausea and vomiting)      Past Surgical History:   Procedure Laterality Date    BI US GUIDED BREAST LOCALIZATION AND BIOPSY LEFT Left 2021    BI US GUIDED BREAST LOCALIZATION AND BIOPSY LEFT [facility]    BREAST BIOPSY Left 2021    COLONOSCOPY      ECTOPIC PREGNANCY SURGERY      ENDOMETRIAL ABLATION      HIP SURGERY Right 2017    JOINT REPLACEMENT Bilateral 1997    hips    REVISION TOTAL HIP ARTHROPLASTY Left     TUBAL LIGATION      UPPER GASTROINTESTINAL ENDOSCOPY       Family History   Problem Relation Name Age of Onset    Ovarian cancer Mother  56    Lung disease Mother      Hypertension Father      Other (bladder cancer) Father      Heart attack Father      Cardiomyopathy Father      Other (bile duct cancer) Sister      Breast cancer Maternal Grandmother  68     Social History     Tobacco Use    Smoking status: Never     Passive exposure: Never    Smokeless tobacco: Never   Vaping Use    Vaping status: Never Used   Substance Use Topics    Alcohol use: Not Currently     Comment: socially 2x/week, 2-3 cocktails    Drug  use: Never     Social History     Tobacco Use   Smoking Status Never    Passive exposure: Never   Smokeless Tobacco Never       Current Outpatient Medications on File Prior to Visit   Medication Sig Dispense Refill    amLODIPine (Norvasc) 5 mg tablet 1 tablet Orally Once a day      amoxicillin (Amoxil) 500 mg capsule Take 4 Tablets 1 Hour Prior to Dental Procedure or Cleaning. 4 capsule 6    glucosam/chond-msm1/C/erik/bor (GLUCOSAMINE-CHOND-MSM COMPLEX ORAL) Take 1 capsule by mouth once daily.      levothyroxine (Synthroid, Levoxyl) 112 mcg tablet Take 1 tablet (112 mcg) by mouth once every 24 hours.      losartan-hydrochlorothiazide (Hyzaar) 100-12.5 mg tablet Take 1 tablet by mouth once every 24 hours.      naproxen (Naprosyn) 250 mg tablet Take 1 tablet (250 mg) by mouth 2 times daily (morning and late afternoon).      oxybutynin XL (Ditropan-XL) 5 mg 24 hr tablet 1 tab(s) orally once a day (oxybutynin XL)      apixaban (Eliquis) 2.5 mg tablet Take 1 tablet (2.5 mg) by mouth 2 times a day. 60 tablet 0    calcium carbonate EX (Tums Extra Strength) 300 mg (750 mg) chewable tablet Chew 300 mg 3 times a day as needed for indigestion or heartburn.      famotidine (Pepcid) 20 mg tablet Take by mouth 2 times a day as needed for heartburn.      pantoprazole (ProtoNix) 40 mg EC tablet Take 1 tablet (40 mg) by mouth once daily. Do not crush, chew, or split. 30 tablet 0    polyethylene glycol (Glycolax, Miralax) 17 gram/dose powder Mix 1 cap (17g) into 8 ounces of fluid and drink by mouth once daily. 238 g 0     No current facility-administered medications on file prior to visit.     Allergies   Allergen Reactions    Meloxicam Hives and Rash         REVIEW OF SYSTEMS:    General: No weight loss, malaise or fevers or other constitutional symptoms.  Neuro: No history of TIA's, stroke,.  No neurological symptoms or problems. No visual changes  Respiratory: No history of respiratory/pulmonary symptoms or  "problems.  Cardiovascular: No history of cardiovascular symptoms or problems.  GI: No history of GI symptoms or problems.   : No history of UTI in past 6 weeks.  No history of  symptoms or problems.  BREASTS: No skin dimpling, nipple discharge or masses.  Endocrine: No history of diabetes. No Thyroid symptoms  Hematology: No history of bleeding or clotting disorder.  Skin: Negative for lesions, rash, and itching.      PHYSICAL EXAMINATION:    /75   Ht 1.499 m (4' 11\")   Wt 72.2 kg (159 lb 3.2 oz)   BMI 32.15 kg/m²   GENERAL: pleasant, female in no apparent distress  HEENT: Normocephalic, atraumatic, mucus membranes moist and no lesions  NEURO: alert and oriented x3,exam grossly non-focal  NECK: Supple, full range of motion, no adenopathy and thyroid normal  DERMATOLOGY: Normal, without lesions, non-icteric and non-hirsute  BREAST: soft, non-tender, symmetric, no dominant mass, normal nipple-areolar complex, no lymphadenopathy and no nipple discharge  CHEST: Normal inspiratory effort    ABDOMEN: soft, non-tender and no masses  PELVIC: external genitalia atrophic, normal Bartholin's glands, urethra, [place]'s glands, no vulvar lesions, no cervical lesions, good vaginal support, physiologic discharge present, normal appearing perineal body and perianal region  BIMANUAL: uterus normal size, shape and consistency, no adnexal masses and non-tender        ASSESSMENT and Plan:      Ashley Ngo is a 59 y.o. female,  who  presented with PMB   Had previous ablation     Diagnoses and all orders for this visit:  PMB (postmenopausal bleeding)  -     US PELVIS TRANSABDOMINAL WITH TRANSVAGINAL; Future      Plan is  - I  ordered transvaginal ultrasound    - I did discuss endometrial biopsy   She opted to do that in OR             Melina Dumont MD    "

## 2024-12-20 ENCOUNTER — HOSPITAL ENCOUNTER (OUTPATIENT)
Dept: RADIOLOGY | Facility: HOSPITAL | Age: 59
Discharge: HOME | End: 2024-12-20
Payer: COMMERCIAL

## 2024-12-20 DIAGNOSIS — N95.0 PMB (POSTMENOPAUSAL BLEEDING): ICD-10-CM

## 2024-12-20 PROCEDURE — 76856 US EXAM PELVIC COMPLETE: CPT

## 2025-01-08 ENCOUNTER — PREP FOR PROCEDURE (OUTPATIENT)
Dept: OBSTETRICS AND GYNECOLOGY | Facility: CLINIC | Age: 60
End: 2025-01-08

## 2025-01-08 ENCOUNTER — OFFICE VISIT (OUTPATIENT)
Dept: OBSTETRICS AND GYNECOLOGY | Facility: CLINIC | Age: 60
End: 2025-01-08
Payer: COMMERCIAL

## 2025-01-08 VITALS
HEIGHT: 59 IN | BODY MASS INDEX: 31.85 KG/M2 | WEIGHT: 158 LBS | DIASTOLIC BLOOD PRESSURE: 73 MMHG | SYSTOLIC BLOOD PRESSURE: 114 MMHG

## 2025-01-08 DIAGNOSIS — N95.0 PMB (POSTMENOPAUSAL BLEEDING): Primary | ICD-10-CM

## 2025-01-08 PROCEDURE — 1036F TOBACCO NON-USER: CPT | Performed by: OBSTETRICS & GYNECOLOGY

## 2025-01-08 PROCEDURE — 99214 OFFICE O/P EST MOD 30 MIN: CPT | Performed by: OBSTETRICS & GYNECOLOGY

## 2025-01-08 PROCEDURE — 3078F DIAST BP <80 MM HG: CPT | Performed by: OBSTETRICS & GYNECOLOGY

## 2025-01-08 PROCEDURE — 3074F SYST BP LT 130 MM HG: CPT | Performed by: OBSTETRICS & GYNECOLOGY

## 2025-01-08 PROCEDURE — 3008F BODY MASS INDEX DOCD: CPT | Performed by: OBSTETRICS & GYNECOLOGY

## 2025-01-08 ASSESSMENT — ENCOUNTER SYMPTOMS
DEPRESSION: 0
LOSS OF SENSATION IN FEET: 0
OCCASIONAL FEELINGS OF UNSTEADINESS: 0

## 2025-01-08 ASSESSMENT — PATIENT HEALTH QUESTIONNAIRE - PHQ9
SUM OF ALL RESPONSES TO PHQ9 QUESTIONS 1 AND 2: 0
2. FEELING DOWN, DEPRESSED OR HOPELESS: NOT AT ALL
1. LITTLE INTEREST OR PLEASURE IN DOING THINGS: NOT AT ALL

## 2025-01-08 ASSESSMENT — PAIN SCALES - GENERAL: PAINLEVEL_OUTOF10: 0-NO PAIN

## 2025-01-08 NOTE — PROGRESS NOTES
Ashley Ngo is a 59 y.o. female,  who presented with  AUB    Spotting   Cramping  Pressure        US:  IMPRESSION:  1. Heterogeneous appearance of the endometrium which measures 0.6 cm  in thickness and is associated internal vascularity. Clinical  follow-up is recommended.  2. Nonspecific heterogeneous appearance of the myometrium with  multiple uterine fibroids identified.  3. Limited evaluation with nonvisualization of bilateral ovaries.      Obstetrical History:  OB History          0    Para   0    Term   0            AB        Living             SAB        IAB        Ectopic        Multiple        Live Births                        Gynecological history:  Menarche: 11   years old   Periods irregular                    Vaginal discharge    No  Menopausal symptoms No        Last Pap:  History of abnormal pap exams? Yes      Past Medical History:   Diagnosis Date    Broken internal joint prosthesis     Bursitis     hips    GERD (gastroesophageal reflux disease)     Hip pain     HLD (hyperlipidemia)     patient denies    HTN (hypertension)     Hypothyroidism     JRA (juvenile rheumatoid arthritis) (Multi)     OA (osteoarthritis)     PONV (postoperative nausea and vomiting)      Past Surgical History:   Procedure Laterality Date    BI US GUIDED BREAST LOCALIZATION AND BIOPSY LEFT Left 2021    BI US GUIDED BREAST LOCALIZATION AND BIOPSY LEFT [facility]    BREAST BIOPSY Left 2021    COLONOSCOPY      ECTOPIC PREGNANCY SURGERY      ENDOMETRIAL ABLATION      HIP SURGERY Right 2017    JOINT REPLACEMENT Bilateral 1997    hips    REVISION TOTAL HIP ARTHROPLASTY Left     TUBAL LIGATION      UPPER GASTROINTESTINAL ENDOSCOPY       Family History   Problem Relation Name Age of Onset    Ovarian cancer Mother  56    Lung disease Mother      Hypertension Father      Other (bladder cancer) Father      Heart attack Father      Cardiomyopathy Father      Other (bile duct cancer)  Sister      Breast cancer Maternal Grandmother  68     Social History     Tobacco Use    Smoking status: Never     Passive exposure: Never    Smokeless tobacco: Never   Vaping Use    Vaping status: Never Used   Substance Use Topics    Alcohol use: Not Currently     Comment: socially 2x/week, 2-3 cocktails    Drug use: Never     Social History     Tobacco Use   Smoking Status Never    Passive exposure: Never   Smokeless Tobacco Never       Current Outpatient Medications on File Prior to Visit   Medication Sig Dispense Refill    amLODIPine (Norvasc) 5 mg tablet 1 tablet Orally Once a day      glucosam/chond-msm1/C/erik/bor (GLUCOSAMINE-CHOND-MSM COMPLEX ORAL) Take 1 capsule by mouth once daily.      levothyroxine (Synthroid, Levoxyl) 112 mcg tablet Take 1 tablet (112 mcg) by mouth once every 24 hours.      losartan-hydrochlorothiazide (Hyzaar) 100-12.5 mg tablet Take 1 tablet by mouth once every 24 hours.      naproxen (Naprosyn) 250 mg tablet Take 1 tablet (250 mg) by mouth 2 times daily (morning and late afternoon).      oxybutynin XL (Ditropan-XL) 5 mg 24 hr tablet 1 tab(s) orally once a day (oxybutynin XL)      amoxicillin (Amoxil) 500 mg capsule Take 4 Tablets 1 Hour Prior to Dental Procedure or Cleaning. 4 capsule 6    apixaban (Eliquis) 2.5 mg tablet Take 1 tablet (2.5 mg) by mouth 2 times a day. 60 tablet 0    calcium carbonate EX (Tums Extra Strength) 300 mg (750 mg) chewable tablet Chew 300 mg 3 times a day as needed for indigestion or heartburn.      famotidine (Pepcid) 20 mg tablet Take by mouth 2 times a day as needed for heartburn.      pantoprazole (ProtoNix) 40 mg EC tablet Take 1 tablet (40 mg) by mouth once daily. Do not crush, chew, or split. 30 tablet 0    polyethylene glycol (Glycolax, Miralax) 17 gram/dose powder Mix 1 cap (17g) into 8 ounces of fluid and drink by mouth once daily. 238 g 0     No current facility-administered medications on file prior to visit.     Allergies   Allergen Reactions  "   Meloxicam Hives and Rash         REVIEW OF SYSTEMS:    General: No weight loss, malaise or fevers or other constitutional symptoms.  Neuro: No history of TIA's, stroke,.  No neurological symptoms or problems. No visual changes  Respiratory: No history of respiratory/pulmonary symptoms or problems.  Cardiovascular: No history of cardiovascular symptoms or problems.  GI: No history of GI symptoms or problems.   : No history of UTI in past 6 weeks.  No history of  symptoms or problems.  BREASTS: No skin dimpling, nipple discharge or masses.  Endocrine: No history of diabetes. No Thyroid symptoms  Hematology: No history of bleeding or clotting disorder.  Skin: Negative for lesions, rash, and itching.      PHYSICAL EXAMINATION:    /73   Ht 1.499 m (4' 11\")   Wt 71.7 kg (158 lb)   BMI 31.91 kg/m²          ASSESSMENT and Plan:    Ashley Ngo is a 59 y.o. female,  who  presented with AUB       Informed consent for hysterescopy D&C        The following risk were reviewed with the patient including, but not limited to, the risk of bleeding, infection, uterine perforation, chronic pain, fistula formation, urethritis, injury to other organs that may require additional surgery,  he patient accepts the above risk and desires to proceed with surgery         Melina Dumont MD    "

## 2025-01-09 PROBLEM — N95.0 PMB (POSTMENOPAUSAL BLEEDING): Status: ACTIVE | Noted: 2025-01-08

## 2025-02-17 ENCOUNTER — PRE-ADMISSION TESTING (OUTPATIENT)
Dept: PREADMISSION TESTING | Facility: HOSPITAL | Age: 60
End: 2025-02-17
Payer: COMMERCIAL

## 2025-02-17 VITALS
RESPIRATION RATE: 16 BRPM | HEART RATE: 74 BPM | HEIGHT: 59 IN | DIASTOLIC BLOOD PRESSURE: 68 MMHG | OXYGEN SATURATION: 99 % | TEMPERATURE: 97.9 F | WEIGHT: 157 LBS | SYSTOLIC BLOOD PRESSURE: 148 MMHG | BODY MASS INDEX: 31.65 KG/M2

## 2025-02-17 DIAGNOSIS — Z01.818 PRE-OP TESTING: Primary | ICD-10-CM

## 2025-02-17 LAB
ANION GAP SERPL CALCULATED.3IONS-SCNC: 12 MMOL/L (ref 10–20)
BASOPHILS # BLD AUTO: 0.1 X10*3/UL (ref 0–0.1)
BASOPHILS NFR BLD AUTO: 1.6 %
BUN SERPL-MCNC: 18 MG/DL (ref 6–23)
CALCIUM SERPL-MCNC: 9.5 MG/DL (ref 8.6–10.3)
CHLORIDE SERPL-SCNC: 105 MMOL/L (ref 98–107)
CO2 SERPL-SCNC: 29 MMOL/L (ref 21–32)
CREAT SERPL-MCNC: 0.96 MG/DL (ref 0.5–1.05)
EGFRCR SERPLBLD CKD-EPI 2021: 67 ML/MIN/1.73M*2
EOSINOPHIL # BLD AUTO: 0.33 X10*3/UL (ref 0–0.7)
EOSINOPHIL NFR BLD AUTO: 5.2 %
ERYTHROCYTE [DISTWIDTH] IN BLOOD BY AUTOMATED COUNT: 13.2 % (ref 11.5–14.5)
GLUCOSE SERPL-MCNC: 99 MG/DL (ref 74–99)
HCT VFR BLD AUTO: 44.9 % (ref 36–46)
HGB BLD-MCNC: 14.9 G/DL (ref 12–16)
IMM GRANULOCYTES # BLD AUTO: 0.01 X10*3/UL (ref 0–0.7)
IMM GRANULOCYTES NFR BLD AUTO: 0.2 % (ref 0–0.9)
LYMPHOCYTES # BLD AUTO: 2.29 X10*3/UL (ref 1.2–4.8)
LYMPHOCYTES NFR BLD AUTO: 35.8 %
MCH RBC QN AUTO: 30.4 PG (ref 26–34)
MCHC RBC AUTO-ENTMCNC: 33.2 G/DL (ref 32–36)
MCV RBC AUTO: 92 FL (ref 80–100)
MONOCYTES # BLD AUTO: 0.58 X10*3/UL (ref 0.1–1)
MONOCYTES NFR BLD AUTO: 9.1 %
NEUTROPHILS # BLD AUTO: 3.08 X10*3/UL (ref 1.2–7.7)
NEUTROPHILS NFR BLD AUTO: 48.1 %
NRBC BLD-RTO: 0 /100 WBCS (ref 0–0)
PLATELET # BLD AUTO: 206 X10*3/UL (ref 150–450)
POTASSIUM SERPL-SCNC: 4.4 MMOL/L (ref 3.5–5.3)
RBC # BLD AUTO: 4.9 X10*6/UL (ref 4–5.2)
SODIUM SERPL-SCNC: 142 MMOL/L (ref 136–145)
WBC # BLD AUTO: 6.4 X10*3/UL (ref 4.4–11.3)

## 2025-02-17 PROCEDURE — 85025 COMPLETE CBC W/AUTO DIFF WBC: CPT

## 2025-02-17 PROCEDURE — 80048 BASIC METABOLIC PNL TOTAL CA: CPT

## 2025-02-17 PROCEDURE — 99204 OFFICE O/P NEW MOD 45 MIN: CPT | Performed by: PHYSICIAN ASSISTANT

## 2025-02-17 PROCEDURE — 36415 COLL VENOUS BLD VENIPUNCTURE: CPT

## 2025-02-17 ASSESSMENT — DUKE ACTIVITY SCORE INDEX (DASI)
CAN YOU HAVE SEXUAL RELATIONS: YES
CAN YOU RUN A SHORT DISTANCE: NO
CAN YOU DO YARD WORK LIKE RAKING LEAVES, WEEDING OR PUSHING A MOWER: NO
CAN YOU WALK A BLOCK OR TWO ON LEVEL GROUND: YES
CAN YOU CLIMB A FLIGHT OF STAIRS OR WALK UP A HILL: YES
CAN YOU WALK INDOORS, SUCH AS AROUND YOUR HOUSE: YES
CAN YOU PARTICIPATE IN STRENOUS SPORTS LIKE SWIMMING, SINGLES TENNIS, FOOTBALL, BASKETBALL, OR SKIING: NO
CAN YOU TAKE CARE OF YOURSELF (EAT, DRESS, BATHE, OR USE TOILET): YES
CAN YOU DO MODERATE WORK AROUND THE HOUSE LIKE VACUUMING, SWEEPING FLOORS OR CARRYING GROCERIES: YES
CAN YOU DO LIGHT WORK AROUND THE HOUSE LIKE DUSTING OR WASHING DISHES: YES
CAN YOU PARTICIPATE IN MODERATE RECREATIONAL ACTIVITIES LIKE GOLF, BOWLING, DANCING, DOUBLES TENNIS OR THROWING A BASEBALL OR FOOTBALL: YES
CAN YOU DO HEAVY WORK AROUND THE HOUSE LIKE SCRUBBING FLOORS OR LIFTING AND MOVING HEAVY FURNITURE: NO
DASI METS SCORE: 6.5
TOTAL_SCORE: 30.2

## 2025-02-17 ASSESSMENT — ENCOUNTER SYMPTOMS
CONSTITUTIONAL NEGATIVE: 1
RESPIRATORY NEGATIVE: 1
CARDIOVASCULAR NEGATIVE: 1
EYES NEGATIVE: 1
GASTROINTESTINAL NEGATIVE: 1
ARTHRALGIAS: 1
NEUROLOGICAL NEGATIVE: 1

## 2025-02-17 NOTE — CPM/PAT H&P
"CPM/PAT Evaluation       Name: Ashley Ngo (Ashley Ngo)  /Age: 1965/59 y.o.     In-Person       Chief Complaint: \"vaginal bleeding\"    HPI  The patient is a 59 year old female who went into menopause at age 48.  In  she had vaginal bleeding X 1 day.  She denied associated clots, bloating, cramping or pelvic pain.  She was seen by Dr. Dumont and on 2024 a pelvic US demonstrated heterogeneous appearance of the endometrium which measures 0.6 cm in thickness and is associated internal vascularity. Clinical follow-up is recommended.  Nonspecific heterogeneous appearance of the myometrium with multiple uterine fibroids identified.  Limited evaluation with nonvisualization of bilateral ovaries.  Surgical intervention is recommended.    Past Medical History:   Diagnosis Date    Broken internal joint prosthesis     Bursitis     hips    COVID-2020    not hospilized - flu like symtoms    Dysfunctional uterine bleeding     Female infertility     GERD (gastroesophageal reflux disease)     Hip pain     HLD (hyperlipidemia)     patient denies    HTN (hypertension)     Hypothyroidism     JRA (juvenile rheumatoid arthritis) (Multi)     OA (osteoarthritis)     PONV (postoperative nausea and vomiting)        Past Surgical History:   Procedure Laterality Date    BI US GUIDED BREAST LOCALIZATION AND BIOPSY LEFT Left 2021    BI US GUIDED BREAST LOCALIZATION AND BIOPSY LEFT [facility]    BREAST BIOPSY Left 2021    COLONOSCOPY      ECTOPIC PREGNANCY SURGERY      ENDOMETRIAL ABLATION      HIP SURGERY Right 2017    Revision    JOINT REPLACEMENT Bilateral 1997    Hip    REVISION TOTAL HIP ARTHROPLASTY Left     TUBAL LIGATION      UPPER GASTROINTESTINAL ENDOSCOPY       Family History   Problem Relation Name Age of Onset    Ovarian cancer Mother Leslie Valenzuela 56    Lung disease Mother Leslie Valenzuela     Cancer Mother Leslie Valenzuela     Miscarriages / Stillbirths " Mother Leslie Valenzuela     Hypertension Father Say Valenzuela     Other (bladder cancer) Father Say Valenzuela     Heart attack Father Say Valenzuela     Cardiomyopathy Father Say Valenzuela     Cancer Father Say Valenzuela     Other (bile duct cancer) Sister [place]     Cancer Sister [place]     Breast cancer Maternal Grandmother Giuliana Edge 68    Diabetes Maternal Grandmother Giuliana Edge     Alcohol abuse Maternal Grandmother Giuliana Edge     Arthritis Maternal Grandmother Giuliana Edge      Social History     Tobacco Use    Smoking status: Never     Passive exposure: Never    Smokeless tobacco: Never   Substance Use Topics    Alcohol use: Yes     Alcohol/week: 5.0 standard drinks of alcohol     Types: 5 Standard drinks or equivalent per week     Comment: SOCIAL DRINKER     Social History     Substance and Sexual Activity   Drug Use Never     Allergies   Allergen Reactions    Meloxicam Hives and Rash     Current Outpatient Medications   Medication Sig Dispense Refill    amLODIPine (Norvasc) 5 mg tablet 1 tablet Orally Once a day      glucosam/chond-msm1/C/erik/bor (GLUCOSAMINE-CHOND-MSM COMPLEX ORAL) Take 1 capsule by mouth once daily.      levothyroxine (Synthroid, Levoxyl) 112 mcg tablet Take 1 tablet (112 mcg) by mouth once every 24 hours.      losartan-hydrochlorothiazide (Hyzaar) 100-12.5 mg tablet Take 1 tablet by mouth once every 24 hours.      naproxen (Naprosyn) 250 mg tablet Take 1 tablet (250 mg) by mouth 2 times daily (morning and late afternoon).      oxybutynin XL (Ditropan-XL) 5 mg 24 hr tablet 1 tab(s) orally once a day (oxybutynin XL)       No current facility-administered medications for this visit.     Review of Systems   Constitutional: Negative.    HENT: Negative.     Eyes: Negative.    Respiratory: Negative.     Cardiovascular: Negative.    Gastrointestinal: Negative.    Genitourinary:         See HPI   Musculoskeletal:  Positive for arthralgias.   Neurological: Negative.      /68    "Pulse 74   Temp 36.6 °C (97.9 °F) (Temporal)   Resp 16   Ht 1.499 m (4' 11\")   Wt 71.2 kg (157 lb)   SpO2 99%   BMI 31.71 kg/m²     Physical Exam  Vitals reviewed.   Constitutional:       Comments: Overweight     HENT:      Head: Normocephalic and atraumatic.      Mouth/Throat:      Mouth: Mucous membranes are moist.      Pharynx: Oropharynx is clear.   Eyes:      Extraocular Movements: Extraocular movements intact.      Pupils: Pupils are equal, round, and reactive to light.   Cardiovascular:      Rate and Rhythm: Normal rate and regular rhythm.      Heart sounds: Normal heart sounds.   Pulmonary:      Effort: Pulmonary effort is normal.      Breath sounds: Normal breath sounds.   Abdominal:      General: Bowel sounds are normal.      Palpations: Abdomen is soft.   Musculoskeletal:         General: No swelling.   Skin:     General: Skin is warm and dry.   Neurological:      General: No focal deficit present.      Mental Status: She is alert and oriented to person, place, and time.   Psychiatric:         Mood and Affect: Mood normal.         Behavior: Behavior normal.        PAT AIRWAY:   Airway:     Mallampati::  II    TM distance::  >3 FB    Neck ROM::  Full   Teeth intact    ASA:  II  DASI SCORE:  30.2  METS SCORE:  6.5  CHAD2 SCORE:  2.8%  REVISED CARDIAC RISK INDEX:  0.4%  STOP BANG SCORE:  2  CAPRINI DVT SCORE:  6  ALEX SCORE:  0.14%  ARISCAT SCORE:   1.6%    CBC, BMP ordered during PAT visit    Assessment and Plan:     Postmenopausal bleeding:  Hysteroscopy, dilation and curettage  Hypertension - taking Norvasc and Hyzaar  Hypothyroidism - taking levothyroxine   Post-operative nausea and vomiting  Juvenile Rheumatoid Arthritis - well controlled with Naprosyn    Vesta Almonte PA-C          "

## 2025-02-17 NOTE — PREPROCEDURE INSTRUCTIONS
PAT DISCHARGE INSTRUCTIONS    Please call the Same Day Surgery (SDS) Department of the hospital where your procedure will be performed after 2:00 PM the day before your surgery. If you are scheduled on a Monday, or a Tuesday following a Monday holiday, you will need to call on the last business day prior to your surgery.    [facility]  [address], [address]  [phone]    [facility]  [address] [place] [address] [phone]    [facility]  [address]  [place] [id number]  [phone]    Please let your surgeon know if:      You develop any open sores, shingles, burning or painful urination as these may increase your risk of an infection.   You no longer wish to have the surgery.   Any other personal circumstances change that may lead to the need to cancel or defer this surgery-such as being sick or getting admitted to any hospital within one week of your planned procedure.    Your contact details change, such as a change of address or phone number.    Starting now:     Please DO NOT drink alcohol or smoke for 24 hours before surgery. It is well known that quitting smoking can make a huge difference to your health and recovery from surgery. The longer you abstain from smoking, the better your chances of a healthy recovery. If you need help with quitting, call 1-800-QUIT-NOW to be connected to a trained counselor who will discuss the best methods to help you quit.     Before your surgery:    Please stop all supplements 7 days prior to surgery. Or as directed by your surgeon.   Please stop taking NSAID pain medicine such as Advil and Motrin 7 days before surgery.    If you develop any fever, cough, cold, rashes, cuts, scratches, scrapes, urinary symptoms or infection anywhere on your body (including teeth and gums) prior to surgery, please call your surgeon’s office as soon as  possible. This may require treatment to reduce the chance of cancellation on the day of surgery.    The day before your surgery:   Get a good night’s rest.  Use the special soap for bathing if you have been instructed to use one.    Scheduled surgery times may change and you will be notified if this occurs - please check your personal voicemail for any updates.     On the morning of surgery:   Wear comfortable, loose fitting clothes which open in the front. Please do not wear moisturizers, creams, lotions, makeup or perfume.    Please bring with you to surgery:   Photo ID and insurance card   Current list of medicines and allergies   Pacemaker/ Defibrillator/Heart stent cards   CPAP machine and mask    Slings/ splints/ crutches   A copy of your complete advanced directive/DHPOA.    Please do NOT bring with you to surgery:   All jewelry and valuables should be left at home.   Prosthetic devices such as contact lenses, hearing aids, dentures, eyelash extensions, hairpins and body piercings must be removed prior to going in to the surgical suite.    After outpatient surgery:   A responsible adult MUST accompany you at the time of discharge and stay with you for 24 hours after your surgery. You may NOT drive yourself home after surgery.    Do not drive, operate machinery, make critical decisions or do activities that require co-ordination or balance until after a night’s sleep.   Do not drink alcoholic beverages for 24 hours.   Instructions for resuming your medications will be provided by your surgeon.    CALL YOUR DOCTOR AFTER SURGERY IF YOU HAVE:     Chills and/or a fever of 101° F or higher.    Redness, swelling, pus or drainage from your surgical wound or a bad smell from the wound.    Lightheadedness, fainting or confusion.    Persistent vomiting (throwing up) and are not able to eat or drink for 12 hours.    Three or more loose, watery bowel movements in 24 hours (diarrhea).   Difficulty or pain while urinating(  after non-urological surgery)    Pain and swelling in your legs, especially if it is only on one side.    Difficulty breathing or are breathing faster than normal.    Any new concerning symptoms.        Preoperative Fasting Guidelines    Why must I stop eating and drinking near surgery time?  With sedation, food or liquid in your stomach can enter your lungs causing serious complications  Increases nausea and vomiting    When do I need to stop eating and drinking before my surgery?  Do not eat any food after midnight the night before your surgery/procedure.  You may have up to 13 ounces of clear liquid until TWO hours before your instructed arrival time to the hospital.  This includes water, black tea/coffee, (no milk or cream) apple juice, and electrolyte drinks (Gatorade)  You may chew gum until TWO hours before your surgery/procedure     Medication List            Accurate as of February 17, 2025  9:54 AM. Always use your most recent med list.                amLODIPine 5 mg tablet  Commonly known as: Norvasc  Medication Adjustments for Surgery: Take on the morning of surgery     GLUCOSAMINE-CHOND-MSM COMPLEX ORAL  Additional Medication Adjustments for Surgery: Take last dose 7 days before surgery     levothyroxine 112 mcg tablet  Commonly known as: Synthroid, Levoxyl  Medication Adjustments for Surgery: Take on the morning of surgery     losartan-hydrochlorothiazide 100-12.5 mg tablet  Commonly known as: Hyzaar  Medication Adjustments for Surgery: Take last dose 1 day (24 hours) before surgery  Notes to patient: Last dose before surgery will be MARCH 2 MORNING     naproxen 250 mg tablet  Commonly known as: Naprosyn  Additional Medication Adjustments for Surgery: Take last dose 7 days before surgery     oxybutynin XL 5 mg 24 hr tablet  Commonly known as: Ditropan-XL  Medication Adjustments for Surgery: Take on the morning of surgery

## 2025-03-03 ENCOUNTER — ANESTHESIA (OUTPATIENT)
Dept: OPERATING ROOM | Facility: HOSPITAL | Age: 60
End: 2025-03-03
Payer: COMMERCIAL

## 2025-03-03 ENCOUNTER — PHARMACY VISIT (OUTPATIENT)
Dept: PHARMACY | Facility: CLINIC | Age: 60
End: 2025-03-03
Payer: COMMERCIAL

## 2025-03-03 ENCOUNTER — HOSPITAL ENCOUNTER (OUTPATIENT)
Facility: HOSPITAL | Age: 60
Setting detail: OUTPATIENT SURGERY
Discharge: HOME | End: 2025-03-03
Attending: OBSTETRICS & GYNECOLOGY | Admitting: OBSTETRICS & GYNECOLOGY
Payer: COMMERCIAL

## 2025-03-03 ENCOUNTER — ANESTHESIA EVENT (OUTPATIENT)
Dept: OPERATING ROOM | Facility: HOSPITAL | Age: 60
End: 2025-03-03
Payer: COMMERCIAL

## 2025-03-03 VITALS
RESPIRATION RATE: 18 BRPM | SYSTOLIC BLOOD PRESSURE: 133 MMHG | HEART RATE: 78 BPM | DIASTOLIC BLOOD PRESSURE: 75 MMHG | TEMPERATURE: 97.5 F | OXYGEN SATURATION: 96 %

## 2025-03-03 DIAGNOSIS — Z98.890 HISTORY OF HYSTEROSCOPY: Primary | ICD-10-CM

## 2025-03-03 DIAGNOSIS — N95.0 PMB (POSTMENOPAUSAL BLEEDING): ICD-10-CM

## 2025-03-03 PROCEDURE — 3600000008 HC OR TIME - EACH INCREMENTAL 1 MINUTE - PROCEDURE LEVEL THREE: Performed by: OBSTETRICS & GYNECOLOGY

## 2025-03-03 PROCEDURE — A4649 SURGICAL SUPPLIES: HCPCS | Performed by: OBSTETRICS & GYNECOLOGY

## 2025-03-03 PROCEDURE — 3700000001 HC GENERAL ANESTHESIA TIME - INITIAL BASE CHARGE: Performed by: OBSTETRICS & GYNECOLOGY

## 2025-03-03 PROCEDURE — A58558 PR HYSTEROSCOPY,W/ENDO BX: Performed by: NURSE ANESTHETIST, CERTIFIED REGISTERED

## 2025-03-03 PROCEDURE — 7100000001 HC RECOVERY ROOM TIME - INITIAL BASE CHARGE: Performed by: OBSTETRICS & GYNECOLOGY

## 2025-03-03 PROCEDURE — 7100000002 HC RECOVERY ROOM TIME - EACH INCREMENTAL 1 MINUTE: Performed by: OBSTETRICS & GYNECOLOGY

## 2025-03-03 PROCEDURE — RXMED WILLOW AMBULATORY MEDICATION CHARGE

## 2025-03-03 PROCEDURE — A58558 PR HYSTEROSCOPY,W/ENDO BX: Performed by: ANESTHESIOLOGY

## 2025-03-03 PROCEDURE — 7100000010 HC PHASE TWO TIME - EACH INCREMENTAL 1 MINUTE: Performed by: OBSTETRICS & GYNECOLOGY

## 2025-03-03 PROCEDURE — 3600000003 HC OR TIME - INITIAL BASE CHARGE - PROCEDURE LEVEL THREE: Performed by: OBSTETRICS & GYNECOLOGY

## 2025-03-03 PROCEDURE — 2500000005 HC RX 250 GENERAL PHARMACY W/O HCPCS: Performed by: OBSTETRICS & GYNECOLOGY

## 2025-03-03 PROCEDURE — 2500000004 HC RX 250 GENERAL PHARMACY W/ HCPCS (ALT 636 FOR OP/ED): Performed by: NURSE ANESTHETIST, CERTIFIED REGISTERED

## 2025-03-03 PROCEDURE — 7100000009 HC PHASE TWO TIME - INITIAL BASE CHARGE: Performed by: OBSTETRICS & GYNECOLOGY

## 2025-03-03 PROCEDURE — 3700000002 HC GENERAL ANESTHESIA TIME - EACH INCREMENTAL 1 MINUTE: Performed by: OBSTETRICS & GYNECOLOGY

## 2025-03-03 PROCEDURE — 2720000007 HC OR 272 NO HCPCS: Performed by: OBSTETRICS & GYNECOLOGY

## 2025-03-03 PROCEDURE — 88305 TISSUE EXAM BY PATHOLOGIST: CPT | Mod: TC,TRILAB,WESLAB | Performed by: OBSTETRICS & GYNECOLOGY

## 2025-03-03 RX ORDER — LIDOCAINE HYDROCHLORIDE 10 MG/ML
INJECTION, SOLUTION INFILTRATION; PERINEURAL AS NEEDED
Status: DISCONTINUED | OUTPATIENT
Start: 2025-03-03 | End: 2025-03-03

## 2025-03-03 RX ORDER — ONDANSETRON HYDROCHLORIDE 2 MG/ML
4 INJECTION, SOLUTION INTRAVENOUS ONCE AS NEEDED
Status: DISCONTINUED | OUTPATIENT
Start: 2025-03-03 | End: 2025-03-03 | Stop reason: HOSPADM

## 2025-03-03 RX ORDER — SILVER NITRATE 38.21; 12.74 MG/1; MG/1
STICK TOPICAL AS NEEDED
Status: DISCONTINUED | OUTPATIENT
Start: 2025-03-03 | End: 2025-03-03 | Stop reason: HOSPADM

## 2025-03-03 RX ORDER — ONDANSETRON HYDROCHLORIDE 2 MG/ML
INJECTION, SOLUTION INTRAVENOUS AS NEEDED
Status: DISCONTINUED | OUTPATIENT
Start: 2025-03-03 | End: 2025-03-03

## 2025-03-03 RX ORDER — HYDRALAZINE HYDROCHLORIDE 20 MG/ML
INJECTION INTRAMUSCULAR; INTRAVENOUS AS NEEDED
Status: DISCONTINUED | OUTPATIENT
Start: 2025-03-03 | End: 2025-03-03

## 2025-03-03 RX ORDER — LIDOCAINE HYDROCHLORIDE 10 MG/ML
0.1 INJECTION, SOLUTION INFILTRATION; PERINEURAL ONCE
Status: DISCONTINUED | OUTPATIENT
Start: 2025-03-03 | End: 2025-03-03 | Stop reason: HOSPADM

## 2025-03-03 RX ORDER — FENTANYL CITRATE 50 UG/ML
50 INJECTION, SOLUTION INTRAMUSCULAR; INTRAVENOUS EVERY 5 MIN PRN
Status: DISCONTINUED | OUTPATIENT
Start: 2025-03-03 | End: 2025-03-03 | Stop reason: HOSPADM

## 2025-03-03 RX ORDER — PROPOFOL 10 MG/ML
INJECTION, EMULSION INTRAVENOUS AS NEEDED
Status: DISCONTINUED | OUTPATIENT
Start: 2025-03-03 | End: 2025-03-03

## 2025-03-03 RX ORDER — MEPERIDINE HYDROCHLORIDE 25 MG/ML
12.5 INJECTION INTRAMUSCULAR; INTRAVENOUS; SUBCUTANEOUS EVERY 10 MIN PRN
Status: DISCONTINUED | OUTPATIENT
Start: 2025-03-03 | End: 2025-03-03 | Stop reason: HOSPADM

## 2025-03-03 RX ORDER — FENTANYL CITRATE 50 UG/ML
INJECTION, SOLUTION INTRAMUSCULAR; INTRAVENOUS AS NEEDED
Status: DISCONTINUED | OUTPATIENT
Start: 2025-03-03 | End: 2025-03-03

## 2025-03-03 RX ORDER — ALBUTEROL SULFATE 0.83 MG/ML
2.5 SOLUTION RESPIRATORY (INHALATION) ONCE AS NEEDED
Status: DISCONTINUED | OUTPATIENT
Start: 2025-03-03 | End: 2025-03-03 | Stop reason: HOSPADM

## 2025-03-03 RX ORDER — MIDAZOLAM HYDROCHLORIDE 1 MG/ML
INJECTION, SOLUTION INTRAMUSCULAR; INTRAVENOUS AS NEEDED
Status: DISCONTINUED | OUTPATIENT
Start: 2025-03-03 | End: 2025-03-03

## 2025-03-03 RX ORDER — MIDAZOLAM HYDROCHLORIDE 1 MG/ML
1 INJECTION, SOLUTION INTRAMUSCULAR; INTRAVENOUS ONCE AS NEEDED
Status: DISCONTINUED | OUTPATIENT
Start: 2025-03-03 | End: 2025-03-03 | Stop reason: HOSPADM

## 2025-03-03 RX ORDER — HYDRALAZINE HYDROCHLORIDE 20 MG/ML
5 INJECTION INTRAMUSCULAR; INTRAVENOUS EVERY 30 MIN PRN
Status: DISCONTINUED | OUTPATIENT
Start: 2025-03-03 | End: 2025-03-03 | Stop reason: HOSPADM

## 2025-03-03 RX ORDER — IBUPROFEN 800 MG/1
800 TABLET ORAL EVERY 6 HOURS PRN
Qty: 30 TABLET | Refills: 1 | Status: SHIPPED | OUTPATIENT
Start: 2025-03-03

## 2025-03-03 RX ORDER — SODIUM CHLORIDE, SODIUM LACTATE, POTASSIUM CHLORIDE, CALCIUM CHLORIDE 600; 310; 30; 20 MG/100ML; MG/100ML; MG/100ML; MG/100ML
100 INJECTION, SOLUTION INTRAVENOUS CONTINUOUS
Status: DISCONTINUED | OUTPATIENT
Start: 2025-03-03 | End: 2025-03-03 | Stop reason: HOSPADM

## 2025-03-03 RX ADMIN — FENTANYL CITRATE 25 MCG: 50 INJECTION, SOLUTION INTRAMUSCULAR; INTRAVENOUS at 10:42

## 2025-03-03 RX ADMIN — FENTANYL CITRATE 25 MCG: 50 INJECTION, SOLUTION INTRAMUSCULAR; INTRAVENOUS at 10:48

## 2025-03-03 RX ADMIN — FENTANYL CITRATE 50 MCG: 50 INJECTION, SOLUTION INTRAMUSCULAR; INTRAVENOUS at 10:29

## 2025-03-03 RX ADMIN — MIDAZOLAM 2 MG: 1 INJECTION INTRAMUSCULAR; INTRAVENOUS at 10:24

## 2025-03-03 RX ADMIN — SODIUM CHLORIDE, POTASSIUM CHLORIDE, SODIUM LACTATE AND CALCIUM CHLORIDE: 600; 310; 30; 20 INJECTION, SOLUTION INTRAVENOUS at 10:24

## 2025-03-03 RX ADMIN — LIDOCAINE HYDROCHLORIDE 50 MG: 10 INJECTION, SOLUTION INFILTRATION; PERINEURAL at 10:29

## 2025-03-03 RX ADMIN — PROPOFOL 150 MG: 10 INJECTION, EMULSION INTRAVENOUS at 10:29

## 2025-03-03 RX ADMIN — DEXAMETHASONE SODIUM PHOSPHATE 8 MG: 4 INJECTION, SOLUTION INTRAMUSCULAR; INTRAVENOUS at 10:36

## 2025-03-03 RX ADMIN — ONDANSETRON HYDROCHLORIDE 4 MG: 2 INJECTION INTRAMUSCULAR; INTRAVENOUS at 10:42

## 2025-03-03 RX ADMIN — HYDRALAZINE HYDROCHLORIDE 5 MG: 20 INJECTION, SOLUTION INTRAMUSCULAR; INTRAVENOUS at 10:47

## 2025-03-03 SDOH — HEALTH STABILITY: MENTAL HEALTH: CURRENT SMOKER: 0

## 2025-03-03 ASSESSMENT — PAIN - FUNCTIONAL ASSESSMENT
PAIN_FUNCTIONAL_ASSESSMENT: 0-10

## 2025-03-03 ASSESSMENT — COLUMBIA-SUICIDE SEVERITY RATING SCALE - C-SSRS
1. IN THE PAST MONTH, HAVE YOU WISHED YOU WERE DEAD OR WISHED YOU COULD GO TO SLEEP AND NOT WAKE UP?: NO
2. HAVE YOU ACTUALLY HAD ANY THOUGHTS OF KILLING YOURSELF?: NO
6. HAVE YOU EVER DONE ANYTHING, STARTED TO DO ANYTHING, OR PREPARED TO DO ANYTHING TO END YOUR LIFE?: NO

## 2025-03-03 ASSESSMENT — PAIN SCALES - GENERAL
PAINLEVEL_OUTOF10: 4
PAINLEVEL_OUTOF10: 4
PAINLEVEL_OUTOF10: 3
PAIN_LEVEL: 2
PAINLEVEL_OUTOF10: 0 - NO PAIN
PAINLEVEL_OUTOF10: 3

## 2025-03-03 ASSESSMENT — PAIN DESCRIPTION - DESCRIPTORS
DESCRIPTORS: CRAMPING
DESCRIPTORS: CRAMPING

## 2025-03-03 NOTE — ANESTHESIA PROCEDURE NOTES
Airway  Date/Time: 3/3/2025 10:31 AM  Urgency: elective    Airway not difficult    Staffing  Performed: CRNA   Authorized by: Wesly Rubi MD    Performed by: EILEEN Gonzalez-CRNA  Patient location during procedure: OR    Indications and Patient Condition  Indications for airway management: anesthesia  Spontaneous Ventilation: absent  Sedation level: deep  Preoxygenated: yes  Patient position: sniffing  MILS maintained throughout  Mask difficulty assessment: 1 - vent by mask    Final Airway Details  Final airway type: supraglottic airway      Successful airway: Size 3     Number of attempts at approach: 1

## 2025-03-03 NOTE — POST-PROCEDURE NOTE
Vitals stable. Pt up to bathroom, mariela pad with scant amount red drainage. IV discontinued with tip intact.

## 2025-03-03 NOTE — ANESTHESIA PREPROCEDURE EVALUATION
Patient: Ashley Ngo    Procedure Information       Date/Time: 03/03/25 1050    Procedure: Hysteroscopy, Dilation and Curettage    [facility]: [facility] / [facility]    Surgeons: Melina Dumont MD            Relevant Problems   Cardiac   (+) Hypertension   (+) Mixed hyperlipidemia      GI   (+) Gastroesophageal reflux disease      Endocrine   (+) Hypothyroid      Musculoskeletal   (+) Osteoarthritis   (+) Rheumatoid arthritis       Clinical information reviewed:    Allergies  Meds   Med Hx  Surg Hx  OB Status  Fam Hx          NPO Detail:  NPO/Void Status  Carbohydrate Drink Given Prior to Surgery? : N  Date of Last Liquid: 03/02/25  Time of Last Liquid: 2000  Date of Last Solid: 03/02/25  Time of Last Solid: 1900         Physical Exam    Airway  Mallampati: II  TM distance: >3 FB  Neck ROM: full     Cardiovascular - normal exam     Dental - normal exam     Pulmonary - normal exam     Abdominal - normal exam             Anesthesia Plan    History of general anesthesia?: yes  History of complications of general anesthesia?: no    ASA 2     general     The patient is not a current smoker.  Patient was not previously instructed to abstain from smoking on day of procedure.  Patient did not smoke on day of procedure.  Education provided regarding risk of obstructive sleep apnea.  intravenous induction   Postoperative administration of opioids is intended.  Trial extubation is planned.  Anesthetic plan and risks discussed with patient.  Use of blood products discussed with patient who consented to blood products.    Plan discussed with CAA, attending and CRNA.

## 2025-03-03 NOTE — DISCHARGE INSTRUCTIONS
POSTOP INSTRUCTIONS    ACTIVITY  No heavy lifting/pushing/pulling for 3 days.  Do not lift anything more than about 5-10 lbs (such as laundry, groceries, children, pets), vacuum, push heavy doors or grocery carts, etc.  You may climb stairs as tolerated.  Do not put anything in the vagina for  2 weeks after surgery unless otherwise instructed by your doctor (including tampons, douching, sexual intercourse, etc).    No driving for about 1 weeks after surgery, while you are taking narcotic pain medication, or until you feel that you are ready.   Avoid sitting or lying in bed for more than 2 hours at a time while you are awake to reduce your risk of blood clots.  You may return to work when directed by your physician.  Please contact your doctor if you need any return to work letters or medical leave paperwork to be completed.    PAIN MANAGEMENT  Take your oral pain medication as needed.  Some pain medications can cause constipation so you should take a stool softener (i.e. colace) while you are on these medications.   You may also take milk of magnesia or Miralax for constipation.      WHAT TO EXPECT AT HOME  Recovery from surgery is generally 2 weeks, but sometimes longer for more strenuous activity.  It is normal to be very tired during this time.    It is normal to have some drainage or a small amount of vaginal bleeding after surgery which may last up to 6 weeks.  You may go home with a purcell catheter in your bladder.  You will need to follow up for a nurse visit in 7-10 days for removal.    You will most likely experience gas pain, abdominal swelling, or shoulder pain for 24-72 hours after surgery.  This is from the carbon dioxide gas put into your abdomen to better visualize your organs.  A warm shower, heating pad, and/or walking may help.    WHEN TO CALL YOUR DOCTOR:  Fever (>100.4°F or 38.0°C) or chills.  Incision problems such as redness, warmth, swelling, or foul smelling drainage.  Severe nausea or  persistent vomiting.  Bright red vaginal bleeding (soaking >1 pad/hour) or foul smelling vaginal drainage.  Severe pain not relieved with pain medication.  Pain and swelling in your legs, especially if it is only on one side and not the other.  Pain with urination, cloudy urine, or foul smelling urine.  Or if you have any other problems or questions.    CALL 911 OR GO TO THE EMERGENCY ROOM IF YOU HAVE:  Any shortness of breath, difficulty breathing, or chest pain.    Follow up in 3 weeks in office with Dr Dumont

## 2025-03-03 NOTE — OP NOTE
Date: 3/3/2025  OR Location: [facility] OR    Name: Ashley Ngo, : 1965, Age: 59 y.o., MRN: [id number], Sex: female    Diagnosis  Pre-op Diagnosis      * PMB (postmenopausal bleeding) [N95.0] Post-op Diagnosis     * PMB (postmenopausal bleeding) [N95.0]     Procedures  Hysteroscopy, Dilation and Curettage, CERVICAL BIOPSY  15481 - CA HYSTEROSCOPY BX ENDOMETRIUM&/POLYPC W/WO D&C      Surgeons      * Melina Dumont - Primary    Resident/Fellow/Other Assistant:  Surgeons and Role:  * No surgeons found with a matching role *    Staff:   Circulator: Tee Barragan Person: Henok Madrid Circulator: Ralf    Anesthesia Staff: Anesthesiologist: Wesly Rubi MD  CRNA: EILEEN Gonzalez-JONATHAN    Procedure Summary  Anesthesia: General  ASA: II  Estimated Blood Loss: 50 mL  Intra-op Medications:   Administrations occurring from 1050 to 1205 on 25:   Medication Name Total Dose   silver nitrate applicators applicator 1 Application   LR bolus Cannot be calculated              Anesthesia Record               Intraprocedure I/O Totals          Intake    Dexmedetomidine 0.00 mL    The total shown is the total volume documented since Anesthesia Start was filed.    LR bolus 350.00 mL    Total Intake 350 mL       Output    Other 10 mL    Total Output 10 mL       Net    Net Volume 340 mL          Specimen:   ID Type Source Tests Collected by Time   1 : ENDOMETRIUM CURETTINGS Tissue ENDOMETRIUM CURETTINGS SURGICAL PATHOLOGY EXAM Melina Dumont MD 3/3/2025 1048   2 : CERVICAL SOFT TISSUE BIOPSY Tissue SOFT TISSUE BIOPSY SURGICAL PATHOLOGY EXAM Melina Dumont MD 3/3/2025 1052                 Procedure Details:  The patient was seen in the preoperative area. The site of surgery was properly noted/marked if necessary per policy. The patient has been actively warmed in preoperative area. Preoperative antibiotics are not indicated. Venous thrombosis prophylaxis have been ordered including bilateral sequential  compression devices    Findings:   Grade 2 prolapse   Cervix mass at 12:00 biopsied   Small polyps       Patient Name:       Ashley Ngo  MRN:                                      [id number]  Surgery Date:       3/3/2025      Surgeon:                                Melina Dumont MD     Anesthesia:            Type: General Anesthesia     Preoperative Diagnosis:   Abnormal uterine bleeding        Postoperative Diagnosis:   1- Same           Procedure:  1- Diagnostic hysteroscopy, Dilatation and curettage  2- cervix biopsy                   Complication: None     Estimated Blood loss: minimal            Procedure Note:   The patient was taken to the operating room and placed under general anesthetic without  complication. She as examined under anesthesia and found to have a small anteverted uterus and no  palpable adnexal masses. She was prepped and draped in the usual sterile fashion in the dorsal  lithotomy position using candy cane stirrups.     The cervix lesion at 12:00 was biopsied      A weighted speculum was placed in the vagina. The anterior lip of the cercix was   visualized and grasped with a single-tooth tenaculum. Using serial Hegar dilators, the cervix was  dilated up to a #5.      A hysteroscope was then inserted and sterile normal saline was used to distend the   cavity. The cavity was inspected with the findings noted above.   The hysteroscope was removed and the cervix was dilated up further to a #7 using the   Hegar dilators. A sharp curette was introduced and uterine curetting was performed. We then used the polyp forceps and the polyps was removed. This was confirmed by the hysterscopy again which showed no polyps      The single tooth tenaculum was then removed and the anterior lip of the cervix was hemostatic. The speculum   was removed.   The patient tolerated the procedure well and was transferred to the recovery room in a stable condition.        Melina Dumont MD

## 2025-03-03 NOTE — ANESTHESIA POSTPROCEDURE EVALUATION
Patient: Ashley MARINA Depetrdamon    Procedure Summary       Date: 03/03/25 Room / Location: [facility]    Anesthesia Start: 1024 Anesthesia Stop: 1110    Procedure: Hysteroscopy, Dilation and Curettage, CERVICAL BIOPSY Diagnosis:       PMB (postmenopausal bleeding)      (PMB (postmenopausal bleeding) [N95.0])    Surgeons: Melina Dumont MD Responsible Provider: Wesly Rubi MD    Anesthesia Type: general ASA Status: 2            Anesthesia Type: general    Vitals Value Taken Time   /54 03/03/25 1156   Temp 36 °C (96.8 °F) 03/03/25 1145   Pulse 76 03/03/25 1158   Resp 14 03/03/25 1158   SpO2 93 % 03/03/25 1158   Vitals shown include unfiled device data.    Anesthesia Post Evaluation    Patient location during evaluation: PACU  Patient participation: complete - patient participated  Level of consciousness: sleepy but conscious  Pain score: 2  Pain management: adequate  Multimodal analgesia pain management approach  Airway patency: patent  Cardiovascular status: acceptable  Respiratory status: acceptable  Hydration status: acceptable  Postoperative Nausea and Vomiting: none        No notable events documented.

## 2025-03-04 ASSESSMENT — PAIN SCALES - GENERAL: PAINLEVEL_OUTOF10: 0 - NO PAIN

## 2025-03-06 LAB
LABORATORY COMMENT REPORT: NORMAL
PATH REPORT.COMMENTS IMP SPEC: NORMAL
PATH REPORT.FINAL DX SPEC: NORMAL
PATH REPORT.GROSS SPEC: NORMAL
PATH REPORT.RELEVANT HX SPEC: NORMAL
PATH REPORT.TOTAL CANCER: NORMAL

## 2025-03-18 ENCOUNTER — OFFICE VISIT (OUTPATIENT)
Facility: CLINIC | Age: 60
End: 2025-03-18
Payer: COMMERCIAL

## 2025-03-18 VITALS
SYSTOLIC BLOOD PRESSURE: 123 MMHG | BODY MASS INDEX: 32.2 KG/M2 | WEIGHT: 159.7 LBS | DIASTOLIC BLOOD PRESSURE: 82 MMHG | HEIGHT: 59 IN

## 2025-03-18 DIAGNOSIS — R93.89 INCREASED ENDOMETRIAL STRIPE THICKNESS: Primary | ICD-10-CM

## 2025-03-18 PROCEDURE — 3074F SYST BP LT 130 MM HG: CPT | Performed by: OBSTETRICS & GYNECOLOGY

## 2025-03-18 PROCEDURE — 99214 OFFICE O/P EST MOD 30 MIN: CPT | Performed by: OBSTETRICS & GYNECOLOGY

## 2025-03-18 PROCEDURE — 3008F BODY MASS INDEX DOCD: CPT | Performed by: OBSTETRICS & GYNECOLOGY

## 2025-03-18 PROCEDURE — 1036F TOBACCO NON-USER: CPT | Performed by: OBSTETRICS & GYNECOLOGY

## 2025-03-18 PROCEDURE — 3079F DIAST BP 80-89 MM HG: CPT | Performed by: OBSTETRICS & GYNECOLOGY

## 2025-03-18 ASSESSMENT — ENCOUNTER SYMPTOMS
LOSS OF SENSATION IN FEET: 0
DEPRESSION: 0
OCCASIONAL FEELINGS OF UNSTEADINESS: 0

## 2025-03-18 ASSESSMENT — PATIENT HEALTH QUESTIONNAIRE - PHQ9
1. LITTLE INTEREST OR PLEASURE IN DOING THINGS: NOT AT ALL
2. FEELING DOWN, DEPRESSED OR HOPELESS: NOT AT ALL
SUM OF ALL RESPONSES TO PHQ9 QUESTIONS 1 AND 2: 0

## 2025-03-18 ASSESSMENT — PAIN SCALES - GENERAL: PAINLEVEL_OUTOF10: 0-NO PAIN

## 2025-03-18 NOTE — PROGRESS NOTES
AS post op    Ashley Ngo is a 59 y.o. female,   who presented for her postoperative visit     She had hysteroscopy D&C       She has been doing very well postoperatively. She denies any fever or other constitutional symptoms. She denies any symptoms of urinary tract infection. She also denies any issues with her bowels. She denies any pelvic or abdominal pain.        Obstetrical History:  OB History          0    Para   0    Term   0            AB        Living             SAB        IAB        Ectopic        Multiple        Live Births                         Past Medical History:   Diagnosis Date    Broken internal joint prosthesis     Bursitis     hips    COVID-2020    not hospilized - flu like symtoms    Dysfunctional uterine bleeding     Female infertility     GERD (gastroesophageal reflux disease)     Hip pain     HLD (hyperlipidemia)     patient denies    HTN (hypertension)     Hypothyroidism     JRA (juvenile rheumatoid arthritis) (Multi)     OA (osteoarthritis)     PONV (postoperative nausea and vomiting)      Past Surgical History:   Procedure Laterality Date    BI US GUIDED BREAST LOCALIZATION AND BIOPSY LEFT Left 2021    BI US GUIDED BREAST LOCALIZATION AND BIOPSY LEFT  CLINICAL LEGACY    BREAST BIOPSY Left 2021    COLONOSCOPY      ECTOPIC PREGNANCY SURGERY      ENDOMETRIAL ABLATION      HIP SURGERY Right 2017    Revision    JOINT REPLACEMENT Bilateral 1997    Hip    REVISION TOTAL HIP ARTHROPLASTY Left     TUBAL LIGATION      UPPER GASTROINTESTINAL ENDOSCOPY       Family History   Problem Relation Name Age of Onset    Ovarian cancer Mother Leslie Nunezjulianaw 56    Lung disease Mother Leslie Corlew     Cancer Mother Leslie Corlew     Miscarriages / Stillbirths Mother Leslie Nunezlew     Hypertension Father Say Valenzuela     Other (bladder cancer) Father Say Valenzuela     Heart attack Father Say Valenzuela     Cardiomyopathy Father Say Valenzuela     Cancer Father  Say Valenzuela     Other (bile duct cancer) Sister tC Sullivan     Cancer Sister Ct Sullivan     Breast cancer Maternal Grandmother Giuliana Edge 68    Diabetes Maternal Grandmother Giuliana Edge     Alcohol abuse Maternal Grandmother Giuilana Edge     Arthritis Maternal Grandmother Giuliana Edge      Social History     Tobacco Use    Smoking status: Never     Passive exposure: Never    Smokeless tobacco: Never   Vaping Use    Vaping status: Never Used   Substance Use Topics    Alcohol use: Yes     Alcohol/week: 5.0 standard drinks of alcohol     Types: 5 Standard drinks or equivalent per week     Comment: SOCIAL DRINKER    Drug use: Never     Social History     Tobacco Use   Smoking Status Never    Passive exposure: Never   Smokeless Tobacco Never       Current Outpatient Medications on File Prior to Visit   Medication Sig Dispense Refill    amLODIPine (Norvasc) 5 mg tablet 1 tablet Orally Once a day      glucosam/chond-msm1/C/erik/bor (GLUCOSAMINE-CHOND-MSM COMPLEX ORAL) Take 1 capsule by mouth once daily.      ibuprofen 800 mg tablet Take 1 tablet (800 mg) by mouth every 6 hours if needed for mild pain (1 - 3). 30 tablet 1    levothyroxine (Synthroid, Levoxyl) 112 mcg tablet Take 1 tablet (112 mcg) by mouth once every 24 hours.      losartan-hydrochlorothiazide (Hyzaar) 100-12.5 mg tablet Take 1 tablet by mouth once every 24 hours.      naproxen (Naprosyn) 250 mg tablet Take 1 tablet (250 mg) by mouth 2 times daily (morning and late afternoon).      oxybutynin XL (Ditropan-XL) 5 mg 24 hr tablet 1 tab(s) orally once a day (oxybutynin XL)       No current facility-administered medications on file prior to visit.     Allergies   Allergen Reactions    Meloxicam Hives and Rash         REVIEW OF SYSTEMS:    General: No weight loss, malaise or fevers or other constitutional symptoms.  Neuro: No history of TIA's, stroke,.  No neurological symptoms or problems. No visual changes  Respiratory: No history of  "respiratory/pulmonary symptoms or problems.  Cardiovascular: No history of cardiovascular symptoms or problems.  GI: No history of GI symptoms or problems.   : No history of UTI in past 6 weeks.  No history of  symptoms or problems.  BREASTS: No skin dimpling, nipple discharge or masses.  Endocrine: No history of diabetes. No Thyroid symptoms  Hematology: No history of bleeding or clotting disorder.  Skin: Negative for lesions, rash, and itching.      PHYSICAL EXAMINATION:    /82   Ht 1.499 m (4' 11\")   Wt 72.4 kg (159 lb 11.2 oz)   BMI 32.26 kg/m²   GENERAL: pleasant, female in no apparent distress  HEENT: Normocephalic, atraumatic, mucus membranes moist and no lesions  NEURO: alert and oriented x3,exam grossly non-focal  NECK: Supple, full range of motion, no adenopathy and thyroid normal  DERMATOLOGY: Normal, without lesions, non-icteric and non-hirsute  CHEST: Normal inspiratory effort    ABDOMEN: soft, non-tender and no masses     PELVIC:  uterus normal size, shape and consistency, no adnexal masses and non-tender        ASSESSMENT and Plan:    Ashley Ngo is a 59 y.o. female,  who presented for her post op visit. She is doing well    I did discuss the pathology which was    Fibroadipose tissue, see comment  -- Endometrium is not sampled; specimen consists predominantly of blood      B. CERVICAL SOFT TISSUE BIOPSY:   -- Squamous mucosa with no pathologic diagnosis             I recommended eithier another biopsy or repeat US  and if linning increase we could resample     Opted for repeat US     FU after US       Melina Dumont MD        This note was produced with voice recognition software and may contain errors in grammar, spelling and content.  If any questions, please feel free to contact me.     I was accompanied by Female Chaperone, LPN  during the exam    "

## 2025-03-20 ENCOUNTER — OFFICE VISIT (OUTPATIENT)
Dept: ORTHOPEDIC SURGERY | Facility: CLINIC | Age: 60
End: 2025-03-20
Payer: COMMERCIAL

## 2025-03-20 ENCOUNTER — HOSPITAL ENCOUNTER (OUTPATIENT)
Dept: RADIOLOGY | Facility: CLINIC | Age: 60
Discharge: HOME | End: 2025-03-20
Payer: COMMERCIAL

## 2025-03-20 DIAGNOSIS — Z47.1 AFTERCARE FOLLOWING LEFT HIP JOINT REPLACEMENT SURGERY: ICD-10-CM

## 2025-03-20 DIAGNOSIS — Z96.642 AFTERCARE FOLLOWING LEFT HIP JOINT REPLACEMENT SURGERY: ICD-10-CM

## 2025-03-20 PROCEDURE — 99214 OFFICE O/P EST MOD 30 MIN: CPT | Performed by: PHYSICIAN ASSISTANT

## 2025-03-20 PROCEDURE — 73502 X-RAY EXAM HIP UNI 2-3 VIEWS: CPT | Mod: LT

## 2025-03-20 NOTE — PROGRESS NOTES
Ashley Pak, KOMAL, PA-C, ATC  Adult Reconstruction and Joint Replacement Surgery  Phone: [phone]     Fax:[phone]            Chief Complaint   Patient presents with    Left Hip - Follow-up       HPI:  Ashley Ngo is a pleasant 59 y.o. year-old female here for follow-up of their revision side: left total hip arthroplasty by Dr. Muniz.  The patient is approximately 1 year(s) postop.The patient has no mechanical symptoms.  The patient has no swelling and pain.   The patients wound has healed uneventfully.  The patient has been doing HEP and/or outpatient PT.  No complications postoperatively.  The patient is very happy with the result of the operation.    Review of Systems  Past Medical History:   Diagnosis Date    Broken internal joint prosthesis     Bursitis     hips    COVID-19 2020    not hospilized - flu like symtoms    Dysfunctional uterine bleeding     Female infertility     GERD (gastroesophageal reflux disease)     Hip pain     HLD (hyperlipidemia)     patient denies    HTN (hypertension)     Hypothyroidism     JRA (juvenile rheumatoid arthritis) (Multi)     OA (osteoarthritis)     PONV (postoperative nausea and vomiting)      Patient Active Problem List   Diagnosis    Failed total hip arthroplasty, initial encounter (CMS-[facility])    Greater trochanteric bursitis    History of total hip replacement    Hypertension    Hypothyroid    Gastroesophageal reflux disease    Osteoarthritis    Rheumatoid arthritis    Metabolic syndrome    Mixed hyperlipidemia    Mixed incontinence    Disorder of joint prosthesis (CMS-[facility])    PMB (postmenopausal bleeding)     Medication Documentation Review Audit       Reviewed by Melina Dumont MD (Physician) on 03/18/25 at 0936      Medication Order Taking? Sig Documenting Provider Last Dose Status   amLODIPine (Norvasc) 5 mg tablet [id number] No 1 tablet Orally Once a day Historical Provider, MD 3/2/2025 Active   glucosam/chond-msm1/C/erik/bor  (GLUCOSAMINE-CHOND-MSM COMPLEX ORAL) [id number] No Take 1 capsule by mouth once daily. Historical Provider, MD Past Month Active   ibuprofen 800 mg tablet [id number]  Take 1 tablet (800 mg) by mouth every 6 hours if needed for mild pain (1 - 3). Melina Dumont MD  Active   levothyroxine (Synthroid, Levoxyl) 112 mcg tablet [id number] No Take 1 tablet (112 mcg) by mouth once every 24 hours. Historical Provider, MD 3/2/2025 Active   losartan-hydrochlorothiazide (Hyzaar) 100-12.5 mg tablet [id number] No Take 1 tablet by mouth once every 24 hours. Historical Provider, MD 3/2/2025 Active   naproxen (Naprosyn) 250 mg tablet [id number] No Take 1 tablet (250 mg) by mouth 2 times daily (morning and late afternoon). Historical Provider, MD Past Month Active   oxybutynin XL (Ditropan-XL) 5 mg 24 hr tablet [id number] No 1 tab(s) orally once a day (oxybutynin XL) Historical Provider, MD 3/2/2025 Active                  Allergies   Allergen Reactions    Meloxicam Hives and Rash     Social History     Socioeconomic History    Marital status:      Spouse name: Not on file    Number of children: Not on file    Years of education: Not on file    Highest education level: Not on file   Occupational History    Not on file   Tobacco Use    Smoking status: Never     Passive exposure: Never    Smokeless tobacco: Never   Vaping Use    Vaping status: Never Used   Substance and Sexual Activity    Alcohol use: Yes     Alcohol/week: 5.0 standard drinks of alcohol     Types: 5 Standard drinks or equivalent per week     Comment: SOCIAL DRINKER    Drug use: Never    Sexual activity: Yes     Partners: Male     Birth control/protection: None   Other Topics Concern    Not on file   Social History Narrative    Not on file     Social Drivers of Health     Financial Resource Strain: Low Risk  (11/5/2024)    Received from [facility]    Overall Financial Resource Strain (CARDIA)     Difficulty of Paying Living Expenses: Not hard at all   Food  Insecurity: No Food Insecurity (11/5/2024)    Received from [facility]    Hunger Vital Sign     Worried About Running Out of Food in the Last Year: Never true     Ran Out of Food in the Last Year: Never true   Transportation Needs: No Transportation Needs (11/5/2024)    Received from [facility]    PRAPARE - Transportation     Lack of Transportation (Medical): No     Lack of Transportation (Non-Medical): No   Physical Activity: Inactive (11/5/2024)    Received from [facility]    Exercise Vital Sign     Days of Exercise per Week: 0 days     Minutes of Exercise per Session: 0 min   Stress: No Stress Concern Present (11/5/2024)    Received from [facility]    [employer] [place] [employer] - Occupational Stress Questionnaire     Feeling of Stress : Not at all   Social Connections: Moderately Integrated (11/5/2024)    Received from [facility]    Social Connection and Isolation Panel [NHANES]     Frequency of Communication with Friends and Family: Three times a week     Frequency of Social Gatherings with Friends and Family: Once a week     Attends Islam Services: Never     Active Member of Clubs or Organizations: Yes     Attends Club or Organization Meetings: 1 to 4 times per year     Marital Status:    Intimate Partner Violence: Not on file   Housing Stability: Low Risk  (8/6/2024)    Housing Stability Vital Sign     Unable to Pay for Housing in the Last Year: No     Number of Times Moved in the Last Year: 1     Homeless in the Last Year: No     Past Surgical History:   Procedure Laterality Date    BI US GUIDED BREAST LOCALIZATION AND BIOPSY LEFT Left 04/20/2021    BI US GUIDED BREAST LOCALIZATION AND BIOPSY LEFT [facility]    BREAST BIOPSY Left 04/16/2021    COLONOSCOPY      ECTOPIC PREGNANCY SURGERY      ENDOMETRIAL ABLATION      HIP SURGERY Right 2017    Revision    JOINT REPLACEMENT Bilateral 1997    Hip    REVISION TOTAL HIP ARTHROPLASTY Left 2024    TUBAL  LIGATION      UPPER GASTROINTESTINAL ENDOSCOPY         Physical Exam  side: left Hip  There were no vitals filed for this visit.  AxO x 3 in NAD, appears stated age. Cooperative.   Assistive Device: no device. Coordination and balance intact.  Skin inact over bilateral upper and lower extremities, no erythema, ecchymosis, temperature changes. No popliteal lymphadenopathy,  No other overlying lesion  mood: euthymic  Respirations non labored  Surgical hip demonstrates pain free ROM with mild tenderness to palpation over greater trochanter laterally.  The incision is midline healing well with no signs of surrounding infection, dehiscence or drainage.   Neurovascularly back to baseline  5/5 hip flexion/knee extension/DF/PF/EHL  SILT in nhung/saph/ per/tib distribution   Extremities warm and well perfused.  No lower extremity calf tenderness or swelling  2+ Femoral/DP/PT pulses bilaterally    Imaging:    I personally reviewed multiple views of the hip today in clinic.  Status post revision side: left Total Hip Arthroplasty. The implant is well fixed, well aligned stem but cup is slightly more vertical compared to last x-rays.  No evidence of mariela-implant fracture, lucency or dislocation. Leg lengths closely restored.    Impression/Plan:  Ashley Ngo is doing well post-operatively and happy with the results of the operation.     S/P side: left Total hip Arthroplasty  I talked with patient at length about activity precautions and progression of activities. The patient understands their permanent precautions. Her cup is slightly more vertical compared to last x-rays. She has no pain and doing well. Case discussed with Dr. Muniz who agrees to monitor over next 6 months.  3. Discussed the importance of dental prophylactic dental antibiotics lifelong. Patient may request medication refill through MyChart,       Pharmacy or surgeons office.    All questions answered.    Follow-up 6 months with x-rays at next  visit.    KOMAL Vasquez, PA-C, ATC  Orthopedic Physician Assisant  Adult Reconstruction and Total Joint Replacement  General Orthopedics  Department of Orthopaedic Surgery  [place] [address]  [employer] preferred

## 2025-06-05 ENCOUNTER — HOSPITAL ENCOUNTER (OUTPATIENT)
Dept: RADIOLOGY | Facility: HOSPITAL | Age: 60
Discharge: HOME | End: 2025-06-05
Payer: COMMERCIAL

## 2025-06-05 VITALS — BODY MASS INDEX: 33.26 KG/M2 | WEIGHT: 165 LBS | HEIGHT: 59 IN

## 2025-06-05 DIAGNOSIS — Z12.31 ENCOUNTER FOR SCREENING MAMMOGRAM FOR MALIGNANT NEOPLASM OF BREAST: ICD-10-CM

## 2025-06-05 PROCEDURE — 77063 BREAST TOMOSYNTHESIS BI: CPT | Performed by: RADIOLOGY

## 2025-06-05 PROCEDURE — 77067 SCR MAMMO BI INCL CAD: CPT | Performed by: RADIOLOGY

## 2025-06-05 PROCEDURE — 77063 BREAST TOMOSYNTHESIS BI: CPT

## 2025-06-18 ENCOUNTER — APPOINTMENT (OUTPATIENT)
Dept: RADIOLOGY | Facility: HOSPITAL | Age: 60
End: 2025-06-18
Payer: COMMERCIAL

## 2025-06-18 DIAGNOSIS — R93.89 INCREASED ENDOMETRIAL STRIPE THICKNESS: ICD-10-CM

## 2025-06-18 PROCEDURE — 76830 TRANSVAGINAL US NON-OB: CPT | Performed by: RADIOLOGY

## 2025-06-18 PROCEDURE — 76856 US EXAM PELVIC COMPLETE: CPT | Performed by: RADIOLOGY

## 2025-06-18 PROCEDURE — 76856 US EXAM PELVIC COMPLETE: CPT

## 2025-09-11 ENCOUNTER — APPOINTMENT (OUTPATIENT)
Dept: ORTHOPEDIC SURGERY | Facility: CLINIC | Age: 60
End: 2025-09-11
Payer: COMMERCIAL

## (undated) DEVICE — DRAPE, INCISE, STERI DRAPE, 36 X 34 IN, DISPOSABLE, STERILE

## (undated) DEVICE — GLOVE, SURGICAL, PROTEXIS PI ORTHO, 7.0, PF, LF

## (undated) DEVICE — SUTURE, VICRYL, 0, 18 IN, CT-1, UNDYED

## (undated) DEVICE — GLOVE, PROTEXIS PI CLASSIC, SZ-7.0, PF, LF

## (undated) DEVICE — CATHETER, RED RUBBER 16FR

## (undated) DEVICE — DRAPE, SHEET, 17 X 23 IN

## (undated) DEVICE — SYRINGE, 50 CC, LUER LOCK

## (undated) DEVICE — STRIP, SKIN CLOSURE, STERI STRIP, REINFORCED, 0.5 X 4 IN

## (undated) DEVICE — WOUND SYSTEM, DEBRIDEMENT & CLEANING, O.R DUOPAK

## (undated) DEVICE — SUTURE, MONOCRYL, 3-0, 27 IN, PS-2, UNDYED

## (undated) DEVICE — ADHESIVE, SKIN, LIQUIBAND EXCEED

## (undated) DEVICE — HOOD, SURGICAL, FLYTE SURGICOOL

## (undated) DEVICE — HOOD, STERISHIELD T4 SYSTEM

## (undated) DEVICE — SUTURE, VICRYL PLUS, 0, 8X18IN, CT-1, UND, BRAIDED

## (undated) DEVICE — Device

## (undated) DEVICE — GLOVE, SURGICAL, PROTEXIS PI ORTHO, 8.0, PF, LF

## (undated) DEVICE — DRAPE, IRRIGATION, W/POUCH, ADHESIVE STRIP, STERI DRAPE, 19 X 23 IN, DISPOSABLE, STERILE

## (undated) DEVICE — SUTURE, VICRYL PLUS, 2-0, 27 IN, CT-2, UNDYED

## (undated) DEVICE — SOLUTION, IRRIGATION, SODIUM CHLORIDE 0.9%, 1000 ML, POUR BOTTLE

## (undated) DEVICE — DRAPE, INCISE, ANTIMICROBIAL, IOBAN 2, STERI DRAPE, 23 X 33 IN, DISPOSABLE, STERILE

## (undated) DEVICE — TOWEL, SURGICAL, NEURO, O/R, 16 X 26, BLUE, STERILE

## (undated) DEVICE — SOLUTION, IRRIGATION, USP, SODIUM CHLORIDE 0.9%, 3000 ML, BAG

## (undated) DEVICE — PREP TRAY, VAGINAL

## (undated) DEVICE — NEEDLE, SPINAL, QUINCKE, 18 G X 3.5 IN, PINK HUB

## (undated) DEVICE — DRESSING, MEPILEX BORDER, POST-OP AG, 4 X 12 IN

## (undated) DEVICE — SLEEVE, VASO PRESS, CALF GARMENT, MEDIUM, GREEN

## (undated) DEVICE — SUTURE, ETHIBOND XTRA, 5 V-37, GRN/BR, LF

## (undated) DEVICE — SEAL SET, MYOSURE ROD LENS SCOPE , SINGLE USE

## (undated) DEVICE — DRILL BIT, RNGLC +ACET, 3.2MM X 30MM

## (undated) DEVICE — GLOVE, SURGICAL, PROTEXIS PI MICRO, 7.5, PF, LF